# Patient Record
Sex: FEMALE | Race: BLACK OR AFRICAN AMERICAN | NOT HISPANIC OR LATINO | URBAN - METROPOLITAN AREA
[De-identification: names, ages, dates, MRNs, and addresses within clinical notes are randomized per-mention and may not be internally consistent; named-entity substitution may affect disease eponyms.]

---

## 2017-11-22 ENCOUNTER — INPATIENT (INPATIENT)
Facility: HOSPITAL | Age: 82
LOS: 1 days | Discharge: HOME | End: 2017-11-24
Attending: HOSPITALIST | Admitting: HOSPITALIST
Payer: MEDICARE

## 2017-11-22 VITALS
TEMPERATURE: 99 F | WEIGHT: 134.92 LBS | SYSTOLIC BLOOD PRESSURE: 141 MMHG | HEART RATE: 80 BPM | HEIGHT: 62 IN | DIASTOLIC BLOOD PRESSURE: 54 MMHG | OXYGEN SATURATION: 96 % | RESPIRATION RATE: 19 BRPM

## 2017-11-22 DIAGNOSIS — I10 ESSENTIAL (PRIMARY) HYPERTENSION: ICD-10-CM

## 2017-11-22 DIAGNOSIS — J06.9 ACUTE UPPER RESPIRATORY INFECTION, UNSPECIFIED: ICD-10-CM

## 2017-11-22 DIAGNOSIS — J40 BRONCHITIS, NOT SPECIFIED AS ACUTE OR CHRONIC: ICD-10-CM

## 2017-11-22 LAB
ALBUMIN SERPL ELPH-MCNC: 3.6 G/DL — SIGNIFICANT CHANGE UP (ref 3.3–5)
ALP SERPL-CCNC: 57 U/L — SIGNIFICANT CHANGE UP (ref 40–120)
ALT FLD-CCNC: 25 U/L — SIGNIFICANT CHANGE UP (ref 12–78)
ANION GAP SERPL CALC-SCNC: 11 MMOL/L — SIGNIFICANT CHANGE UP (ref 5–17)
ANION GAP SERPL CALC-SCNC: 8 MMOL/L — SIGNIFICANT CHANGE UP (ref 5–17)
APTT BLD: 28.2 SEC — SIGNIFICANT CHANGE UP (ref 27.5–37.4)
AST SERPL-CCNC: 27 U/L — SIGNIFICANT CHANGE UP (ref 15–37)
BASE EXCESS BLDA CALC-SCNC: -3.3 MMOL/L — LOW (ref -2–2)
BASOPHILS # BLD AUTO: 0.1 K/UL — SIGNIFICANT CHANGE UP (ref 0–0.2)
BASOPHILS NFR BLD AUTO: 1.2 % — SIGNIFICANT CHANGE UP (ref 0–2)
BILIRUB SERPL-MCNC: 0.4 MG/DL — SIGNIFICANT CHANGE UP (ref 0.2–1.2)
BLOOD GAS COMMENTS: SIGNIFICANT CHANGE UP
BLOOD GAS COMMENTS: SIGNIFICANT CHANGE UP
BLOOD GAS SOURCE: SIGNIFICANT CHANGE UP
BUN SERPL-MCNC: 20 MG/DL — SIGNIFICANT CHANGE UP (ref 7–23)
BUN SERPL-MCNC: 23 MG/DL — SIGNIFICANT CHANGE UP (ref 7–23)
CALCIUM SERPL-MCNC: 8.1 MG/DL — LOW (ref 8.5–10.1)
CALCIUM SERPL-MCNC: 8.4 MG/DL — LOW (ref 8.5–10.1)
CHLORIDE SERPL-SCNC: 103 MMOL/L — SIGNIFICANT CHANGE UP (ref 96–108)
CHLORIDE SERPL-SCNC: 106 MMOL/L — SIGNIFICANT CHANGE UP (ref 96–108)
CO2 SERPL-SCNC: 23 MMOL/L — SIGNIFICANT CHANGE UP (ref 22–31)
CO2 SERPL-SCNC: 26 MMOL/L — SIGNIFICANT CHANGE UP (ref 22–31)
CREAT SERPL-MCNC: 0.96 MG/DL — SIGNIFICANT CHANGE UP (ref 0.5–1.3)
CREAT SERPL-MCNC: 1.47 MG/DL — HIGH (ref 0.5–1.3)
D DIMER BLD IA.RAPID-MCNC: 206 NG/ML DDU — SIGNIFICANT CHANGE UP
EOSINOPHIL # BLD AUTO: 0 K/UL — SIGNIFICANT CHANGE UP (ref 0–0.5)
EOSINOPHIL NFR BLD AUTO: 0.5 % — SIGNIFICANT CHANGE UP (ref 0–6)
FLUAV SPEC QL CULT: NEGATIVE — SIGNIFICANT CHANGE UP
FLUBV AG SPEC QL IA: NEGATIVE — SIGNIFICANT CHANGE UP
GLUCOSE SERPL-MCNC: 131 MG/DL — HIGH (ref 70–99)
GLUCOSE SERPL-MCNC: 441 MG/DL — HIGH (ref 70–99)
HCO3 BLDA-SCNC: 21 MMOL/L — SIGNIFICANT CHANGE UP (ref 21–29)
HCT VFR BLD CALC: 36.6 % — SIGNIFICANT CHANGE UP (ref 34.5–45)
HGB BLD-MCNC: 12.1 G/DL — SIGNIFICANT CHANGE UP (ref 11.5–15.5)
HOROWITZ INDEX BLDA+IHG-RTO: 21 — SIGNIFICANT CHANGE UP
INR BLD: 0.98 RATIO — SIGNIFICANT CHANGE UP (ref 0.88–1.16)
LYMPHOCYTES # BLD AUTO: 0.7 K/UL — LOW (ref 1–3.3)
LYMPHOCYTES # BLD AUTO: 12.1 % — LOW (ref 13–44)
MCHC RBC-ENTMCNC: 30 PG — SIGNIFICANT CHANGE UP (ref 27–34)
MCHC RBC-ENTMCNC: 33.1 GM/DL — SIGNIFICANT CHANGE UP (ref 32–36)
MCV RBC AUTO: 90.7 FL — SIGNIFICANT CHANGE UP (ref 80–100)
MONOCYTES # BLD AUTO: 0.6 K/UL — SIGNIFICANT CHANGE UP (ref 0–0.9)
MONOCYTES NFR BLD AUTO: 11.6 % — SIGNIFICANT CHANGE UP (ref 2–14)
NEUTROPHILS # BLD AUTO: 4.1 K/UL — SIGNIFICANT CHANGE UP (ref 1.8–7.4)
NEUTROPHILS NFR BLD AUTO: 74.5 % — SIGNIFICANT CHANGE UP (ref 43–77)
PCO2 BLDA: 36 MMHG — SIGNIFICANT CHANGE UP (ref 32–46)
PH BLD: 7.38 — SIGNIFICANT CHANGE UP (ref 7.35–7.45)
PLATELET # BLD AUTO: 166 K/UL — SIGNIFICANT CHANGE UP (ref 150–400)
PO2 BLDA: 66 MMHG — LOW (ref 74–108)
POTASSIUM SERPL-MCNC: 3.9 MMOL/L — SIGNIFICANT CHANGE UP (ref 3.5–5.3)
POTASSIUM SERPL-MCNC: 4.1 MMOL/L — SIGNIFICANT CHANGE UP (ref 3.5–5.3)
POTASSIUM SERPL-SCNC: 3.9 MMOL/L — SIGNIFICANT CHANGE UP (ref 3.5–5.3)
POTASSIUM SERPL-SCNC: 4.1 MMOL/L — SIGNIFICANT CHANGE UP (ref 3.5–5.3)
PROT SERPL-MCNC: 7 GM/DL — SIGNIFICANT CHANGE UP (ref 6–8.3)
PROTHROM AB SERPL-ACNC: 10.7 SEC — SIGNIFICANT CHANGE UP (ref 9.8–12.7)
RBC # BLD: 4.04 M/UL — SIGNIFICANT CHANGE UP (ref 3.8–5.2)
RBC # FLD: 12.9 % — SIGNIFICANT CHANGE UP (ref 11–15)
SAO2 % BLDA: 92 % — SIGNIFICANT CHANGE UP (ref 92–96)
SODIUM SERPL-SCNC: 137 MMOL/L — SIGNIFICANT CHANGE UP (ref 135–145)
SODIUM SERPL-SCNC: 140 MMOL/L — SIGNIFICANT CHANGE UP (ref 135–145)
WBC # BLD: 5.5 K/UL — SIGNIFICANT CHANGE UP (ref 3.8–10.5)
WBC # FLD AUTO: 5.5 K/UL — SIGNIFICANT CHANGE UP (ref 3.8–10.5)

## 2017-11-22 PROCEDURE — 99223 1ST HOSP IP/OBS HIGH 75: CPT | Mod: AI

## 2017-11-22 PROCEDURE — 99285 EMERGENCY DEPT VISIT HI MDM: CPT

## 2017-11-22 PROCEDURE — 71020: CPT | Mod: 26

## 2017-11-22 PROCEDURE — 93010 ELECTROCARDIOGRAM REPORT: CPT

## 2017-11-22 RX ORDER — ALBUTEROL 90 UG/1
2.5 AEROSOL, METERED ORAL EVERY 6 HOURS
Qty: 0 | Refills: 0 | Status: DISCONTINUED | OUTPATIENT
Start: 2017-11-22 | End: 2017-11-24

## 2017-11-22 RX ORDER — ASPIRIN/CALCIUM CARB/MAGNESIUM 324 MG
81 TABLET ORAL DAILY
Qty: 0 | Refills: 0 | Status: DISCONTINUED | OUTPATIENT
Start: 2017-11-22 | End: 2017-11-24

## 2017-11-22 RX ORDER — MAGNESIUM SULFATE 500 MG/ML
1 VIAL (ML) INJECTION ONCE
Qty: 0 | Refills: 0 | Status: DISCONTINUED | OUTPATIENT
Start: 2017-11-22 | End: 2017-11-22

## 2017-11-22 RX ORDER — CEFTRIAXONE 500 MG/1
1 INJECTION, POWDER, FOR SOLUTION INTRAMUSCULAR; INTRAVENOUS ONCE
Qty: 0 | Refills: 0 | Status: COMPLETED | OUTPATIENT
Start: 2017-11-22 | End: 2017-11-22

## 2017-11-22 RX ORDER — CEFTRIAXONE 500 MG/1
INJECTION, POWDER, FOR SOLUTION INTRAMUSCULAR; INTRAVENOUS
Qty: 0 | Refills: 0 | Status: DISCONTINUED | OUTPATIENT
Start: 2017-11-22 | End: 2017-11-24

## 2017-11-22 RX ORDER — SODIUM CHLORIDE 9 MG/ML
1000 INJECTION INTRAMUSCULAR; INTRAVENOUS; SUBCUTANEOUS ONCE
Qty: 0 | Refills: 0 | Status: COMPLETED | OUTPATIENT
Start: 2017-11-22 | End: 2017-11-22

## 2017-11-22 RX ORDER — ENOXAPARIN SODIUM 100 MG/ML
40 INJECTION SUBCUTANEOUS EVERY 24 HOURS
Qty: 0 | Refills: 0 | Status: DISCONTINUED | OUTPATIENT
Start: 2017-11-22 | End: 2017-11-24

## 2017-11-22 RX ORDER — CEFTRIAXONE 500 MG/1
1 INJECTION, POWDER, FOR SOLUTION INTRAMUSCULAR; INTRAVENOUS EVERY 24 HOURS
Qty: 0 | Refills: 0 | Status: DISCONTINUED | OUTPATIENT
Start: 2017-11-23 | End: 2017-11-24

## 2017-11-22 RX ORDER — IPRATROPIUM/ALBUTEROL SULFATE 18-103MCG
3 AEROSOL WITH ADAPTER (GRAM) INHALATION
Qty: 0 | Refills: 0 | Status: COMPLETED | OUTPATIENT
Start: 2017-11-22 | End: 2017-11-22

## 2017-11-22 RX ORDER — AZITHROMYCIN 500 MG/1
500 TABLET, FILM COATED ORAL ONCE
Qty: 0 | Refills: 0 | Status: COMPLETED | OUTPATIENT
Start: 2017-11-22 | End: 2017-11-22

## 2017-11-22 RX ORDER — AZITHROMYCIN 500 MG/1
500 TABLET, FILM COATED ORAL DAILY
Qty: 0 | Refills: 0 | Status: DISCONTINUED | OUTPATIENT
Start: 2017-11-23 | End: 2017-11-24

## 2017-11-22 RX ORDER — IPRATROPIUM/ALBUTEROL SULFATE 18-103MCG
3 AEROSOL WITH ADAPTER (GRAM) INHALATION ONCE
Qty: 0 | Refills: 0 | Status: COMPLETED | OUTPATIENT
Start: 2017-11-22 | End: 2017-11-22

## 2017-11-22 RX ORDER — MAGNESIUM SULFATE 500 MG/ML
1 VIAL (ML) INJECTION ONCE
Qty: 0 | Refills: 0 | Status: COMPLETED | OUTPATIENT
Start: 2017-11-22 | End: 2017-11-22

## 2017-11-22 RX ORDER — ALBUTEROL 90 UG/1
1 AEROSOL, METERED ORAL EVERY 4 HOURS
Qty: 0 | Refills: 0 | Status: DISCONTINUED | OUTPATIENT
Start: 2017-11-22 | End: 2017-11-24

## 2017-11-22 RX ADMIN — AZITHROMYCIN 255 MILLIGRAM(S): 500 TABLET, FILM COATED ORAL at 13:20

## 2017-11-22 RX ADMIN — Medication 81 MILLIGRAM(S): at 17:23

## 2017-11-22 RX ADMIN — Medication 125 MILLIGRAM(S): at 12:20

## 2017-11-22 RX ADMIN — CEFTRIAXONE 100 GRAM(S): 500 INJECTION, POWDER, FOR SOLUTION INTRAMUSCULAR; INTRAVENOUS at 17:23

## 2017-11-22 RX ADMIN — SODIUM CHLORIDE 1000 MILLILITER(S): 9 INJECTION INTRAMUSCULAR; INTRAVENOUS; SUBCUTANEOUS at 12:00

## 2017-11-22 RX ADMIN — ALBUTEROL 2.5 MILLIGRAM(S): 90 AEROSOL, METERED ORAL at 23:32

## 2017-11-22 RX ADMIN — Medication 3 MILLILITER(S): at 12:20

## 2017-11-22 RX ADMIN — Medication 100 GRAM(S): at 14:12

## 2017-11-22 RX ADMIN — Medication 3 MILLILITER(S): at 13:20

## 2017-11-22 RX ADMIN — ENOXAPARIN SODIUM 40 MILLIGRAM(S): 100 INJECTION SUBCUTANEOUS at 17:23

## 2017-11-22 RX ADMIN — ALBUTEROL 2.5 MILLIGRAM(S): 90 AEROSOL, METERED ORAL at 18:05

## 2017-11-22 RX ADMIN — Medication 3 MILLILITER(S): at 12:05

## 2017-11-22 NOTE — H&P ADULT - ASSESSMENT
85f with acute bronchitis feeling better with nebs     IMPROVE VTE Individual Risk Assessment          RISK                                                          Points    [  ] Previous VTE                                                3    [  ] Thrombophilia                                             2    [  ] Lower limb paralysis                                    2        (unable to hold up >15 seconds)      [  ] Current Cancer                                             2         (within 6 months)    [  x] Immobilization > 24 hrs                              1    [  ] ICU/CCU stay > 24 hours                            1    [  x] Age > 60                                                    1  2  IMPROVE VTE Score2 _________

## 2017-11-22 NOTE — H&P ADULT - NSHPLABSRESULTS_GEN_ALL_CORE
12.1   5.5   )-----------( 166      ( 22 Nov 2017 12:07 )             36.6   11-22    140  |  106  |  20  ----------------------------<  131<H>  4.1   |  26  |  0.96    Ca    8.4<L>      22 Nov 2017 12:07    TPro  7.0  /  Alb  3.6  /  TBili  0.4  /  DBili  x   /  AST  27  /  ALT  25  /  AlkPhos  57  11-22  < from: Xray Chest 2 Views PA/Lat (11.22.17 @ 12:36) >    ear lungs.No acute airspace disease suggested.

## 2017-11-22 NOTE — H&P ADULT - NSHPPHYSICALEXAM_GEN_ALL_CORE
GENERAL: NAD well-developed  HEAD:  Atraumatic, Normocephalic  EYES: EOMI, PERRLA, conjunctiva and sclera clear  ENMT: No tonsillar erythema, exudates, or enlargement; Moist mucous membranes, Good dentition, No lesions  NECK: Supple, No JVD, Normal thyroid  NERVOUS SYSTEM:  Alert & Oriented X3, Good concentration; Motor Strength 5/5 B/L upper and lower extremities; DTRs 2+ intact and symmetric  CHEST/LUNG: diffuse wheezing expiratory   HEART: Regular rate and rhythm; No murmurs, rubs, or gallops  ABDOMEN: Soft, Nontender, Nondistended; Bowel sounds present  EXTREMITIES:  2+ Peripheral Pulses, No clubbing, cyanosis, or edema  LYMPH: No lymphadenopathy   SKIN: No rashes or lesions

## 2017-11-22 NOTE — H&P ADULT - NSHPREVIEWOFSYSTEMS_GEN_ALL_CORE
CONSTITUTIONAL: No fever, weight loss, or fatigue  EYES: No eye pain, visual disturbances, or discharge  ENMT:  No difficulty hearing, tinnitus, vertigo; No sinus or throat pain  NECK: No pain or stiffness  RESPIRATORY: POSITIVE  cough and  and  shortness of breath  CARDIOVASCULAR: No chest pain, palpitations, dizziness, or leg swelling  GASTROINTESTINAL: No abdominal or epigastric pain. No nausea, vomiting, or hematemesis; No diarrhea or constipation. No melena or hematochezia.  GENITOURINARY: No dysuria, frequency, hematuria, or incontinence  NEUROLOGICAL: No headaches, memory loss, loss of strength, numbness, or tremors  SKIN: No itching, burning, rashes, or lesions   LYMPH NODES: No enlarged glands  ENDOCRINE: No heat or cold intolerance; No hair loss  MUSCULOSKELETAL: No joint pain or swelling; No muscle, back, or extremity pain  PSYCHIATRIC: No depression, anxiety, mood swings, or difficulty sleeping  HEME/LYMPH: No easy bruising, or bleeding gums  ALLERGY AND IMMUNOLOGIC: No hives or eczema

## 2017-11-22 NOTE — ED PROVIDER NOTE - OBJECTIVE STATEMENT
85 year old female with h/o HTN presents today c/o one week history of cold symptoms, pt was in [place] x 6 weeks, returned on 11/19/17, while on the plane pt states that she sat next to a person that coughed throughout the entire flight, the next day her symptoms started +cough +sore throat +nonspecific sharp pain which she describes feeling like spasms when she cough (-) fevers or chills (-) weakness (-) bodyaches, she did call her PMD on 11/17/17 and was prescribed flonase, her PMD felt her symptoms were due to a postnasal drip, pt feels that she is not getting better

## 2017-11-22 NOTE — ED PROVIDER NOTE - CARE PLAN
Principal Discharge DX:	URI (upper respiratory infection) Principal Discharge DX:	URI (upper respiratory infection)  Secondary Diagnosis:	Bronchitis

## 2017-11-22 NOTE — ED ADULT TRIAGE NOTE - CHIEF COMPLAINT QUOTE
Pt c/o of flu like symptoms, coughig and chest congestion x 5 days since she came back from [facility]. Pt state that the passenger sitting next to her was coughing. Pt also reported chest pain  when coughing

## 2017-11-22 NOTE — H&P ADULT - HISTORY OF PRESENT ILLNESS
85 year old female with h/o HTN presents today c/o one week history of cold symptoms, pt was in [place] x 6 weeks, returned on 11/19/17, while on the plane pt states that she sat next to a person that coughed throughout the entire flight, the next day her symptoms started +cough +sore throat +nonspecific sharp pain which she describes feeling like spasms when she cough (-) fevers or chills (-) weakness (-) bodyaches POSITIVE wheezing , she did call her PMD on 11/17/17 and was prescribed flonase, her PMD felt her symptoms were due to a postnasal drip, pt feels that she is not getting better

## 2017-11-23 LAB
APPEARANCE UR: CLEAR — SIGNIFICANT CHANGE UP
BACTERIA # UR AUTO: ABNORMAL
BILIRUB UR-MCNC: NEGATIVE — SIGNIFICANT CHANGE UP
COLOR SPEC: YELLOW — SIGNIFICANT CHANGE UP
DIFF PNL FLD: ABNORMAL
EPI CELLS # UR: SIGNIFICANT CHANGE UP
GLUCOSE UR QL: 1000 MG/DL
HCT VFR BLD CALC: 33 % — LOW (ref 34.5–45)
HGB BLD-MCNC: 10.7 G/DL — LOW (ref 11.5–15.5)
KETONES UR-MCNC: NEGATIVE — SIGNIFICANT CHANGE UP
LEUKOCYTE ESTERASE UR-ACNC: ABNORMAL
MCHC RBC-ENTMCNC: 29.2 PG — SIGNIFICANT CHANGE UP (ref 27–34)
MCHC RBC-ENTMCNC: 32.6 GM/DL — SIGNIFICANT CHANGE UP (ref 32–36)
MCV RBC AUTO: 89.6 FL — SIGNIFICANT CHANGE UP (ref 80–100)
NITRITE UR-MCNC: NEGATIVE — SIGNIFICANT CHANGE UP
PH UR: 5 — SIGNIFICANT CHANGE UP (ref 5–8)
PLATELET # BLD AUTO: 184 K/UL — SIGNIFICANT CHANGE UP (ref 150–400)
PROT UR-MCNC: NEGATIVE MG/DL — SIGNIFICANT CHANGE UP
RBC # BLD: 3.68 M/UL — LOW (ref 3.8–5.2)
RBC # FLD: 13.1 % — SIGNIFICANT CHANGE UP (ref 11–15)
RBC CASTS # UR COMP ASSIST: SIGNIFICANT CHANGE UP /HPF (ref 0–4)
SP GR SPEC: 1.01 — SIGNIFICANT CHANGE UP (ref 1.01–1.02)
UROBILINOGEN FLD QL: NEGATIVE MG/DL — SIGNIFICANT CHANGE UP
WBC # BLD: 11.3 K/UL — HIGH (ref 3.8–10.5)
WBC # FLD AUTO: 11.3 K/UL — HIGH (ref 3.8–10.5)
WBC UR QL: SIGNIFICANT CHANGE UP

## 2017-11-23 PROCEDURE — 99233 SBSQ HOSP IP/OBS HIGH 50: CPT

## 2017-11-23 RX ADMIN — ALBUTEROL 2.5 MILLIGRAM(S): 90 AEROSOL, METERED ORAL at 11:31

## 2017-11-23 RX ADMIN — AZITHROMYCIN 500 MILLIGRAM(S): 500 TABLET, FILM COATED ORAL at 12:07

## 2017-11-23 RX ADMIN — ALBUTEROL 2.5 MILLIGRAM(S): 90 AEROSOL, METERED ORAL at 06:16

## 2017-11-23 RX ADMIN — CEFTRIAXONE 100 GRAM(S): 500 INJECTION, POWDER, FOR SOLUTION INTRAMUSCULAR; INTRAVENOUS at 17:26

## 2017-11-23 RX ADMIN — Medication 81 MILLIGRAM(S): at 12:07

## 2017-11-23 RX ADMIN — ENOXAPARIN SODIUM 40 MILLIGRAM(S): 100 INJECTION SUBCUTANEOUS at 17:30

## 2017-11-23 NOTE — PROGRESS NOTE ADULT - SUBJECTIVE AND OBJECTIVE BOX
CHIEF COMPLAINT/INTERVAL HISTORY:    Patient is a 85y old  Female who presents with a chief complaint of cough (2017 16:29)      HPI:  85 year old female with h/o HTN presents today c/o one week history of cold symptoms, pt was in [place] x 6 weeks, returned on 17, while on the plane pt states that she sat next to a person that coughed throughout the entire flight, the next day her symptoms started +cough +sore throat +nonspecific sharp pain which she describes feeling like spasms when she cough (-) fevers or chills (-) weakness (-) bodyaches POSITIVE wheezing , she did call her PMD on 17 and was prescribed flonase, her PMD felt her symptoms were due to a postnasal drip, pt feels that she is not getting better (2017 16:29)    Overnight issues  Still has some wheezing and cough  Feeling some better          SUBJECTIVE & OBJECTIVE: Pt seen and examined at bedside.   ROS:  CONSTITUTIONAL: No fever, weight loss, or fatigue  EYES: No eye pain, visual disturbances, or discharge  ENMT:  No difficulty hearing, tinnitus, vertigo; No sinus or throat pain  NECK: No pain or stiffness  RESPIRATORY: + cough,+ wheezing,  Nochills or hemoptysis; No shortness of breath  CARDIOVASCULAR: No chest pain, palpitations, dizziness, or leg swelling  GASTROINTESTINAL: No abdominal or epigastric pain. No nausea, vomiting, or hematemesis; No diarrhea or constipation. No melena or hematochezia.  GENITOURINARY: No dysuria, frequency, hematuria, or incontinence  NEUROLOGICAL: No headaches, memory loss, loss of strength, numbness, or tremors  SKIN: No itching, burning, rashes, or lesions   LYMPH NODES: No enlarged glands  ENDOCRINE: No heat or cold intolerance; No hair loss  MUSCULOSKELETAL: No joint pain or swelling; No muscle, back, or extremity pain  PSYCHIATRIC: No depression, anxiety, mood swings, or difficulty sleeping  HEME/LYMPH: No easy bruising, or bleeding gums  ALLERGY AND IMMUNOLOGIC: No hives or eczema  ICU Vital Signs Last 24 Hrs  T(C): 37.3 (2017 06:22), Max: 37.3 (2017 18:28)  T(F): 99.2 (2017 06:22), Max: 99.2 (2017 18:28)  HR: 71 (2017 06:43) (71 - 113)  BP: 132/50 (2017 06:22) (123/50 - 145/70)  BP(mean): 90 (2017 14:18) (90 - 90)  ABP: --  ABP(mean): --  RR: 16 (2017 06:22) (16 - 22)  SpO2: 98% (2017 06:43) (92% - 100%)        MEDICATIONS  (STANDING):  ALBUTerol    0.083% 2.5 milliGRAM(s) Nebulizer every 6 hours  ALBUTerol    90 MICROgram(s) HFA Inhaler 1 Puff(s) Inhalation every 4 hours  aspirin enteric coated 81 milliGRAM(s) Oral daily  azithromycin   Tablet 500 milliGRAM(s) Oral daily  cefTRIAXone   IVPB 1 Gram(s) IV Intermittent every 24 hours  cefTRIAXone   IVPB      enoxaparin Injectable 40 milliGRAM(s) SubCutaneous every 24 hours    MEDICATIONS  (PRN):        PHYSICAL EXAM:    GENERAL: NAD, well-groomed, well-developed  HEAD:  Atraumatic, Normocephalic  EYES: EOMI, PERRLA, conjunctiva and sclera clear  ENMT: Moist mucous membranes  NECK: Supple, No JVD  NERVOUS SYSTEM:  Alert & Oriented X3, Motor Strength 5/5 B/L upper and lower extremities; DTRs 2+ intact and symmetric  CHEST/LUNG: Clear to auscultation bilaterally; No rales, rhonchi, +wheezing  HEART: Regular rate and rhythm; No murmurs, rubs, or gallops  ABDOMEN: Soft, Nontender, Nondistended; Bowel sounds present  EXTREMITIES:  2+ Peripheral Pulses, No clubbing, cyanosis, or edema    LABS:                        10.7   11.3  )-----------( 184      ( 2017 07:29 )             33.0     11-22    137  |  103  |  23  ----------------------------<  441<H>  3.9   |  23  |  1.47<H>    Ca    8.1<L>      2017 17:56    TPro  7.0  /  Alb  3.6  /  TBili  0.4  /  DBili  x   /  AST  27  /  ALT  25  /  AlkPhos  57  11-22    PT/INR - ( 2017 16:28 )   PT: 10.7 sec;   INR: 0.98 ratio         PTT - ( 2017 16:28 )  PTT:28.2 sec  Urinalysis Basic - ( 2017 01:10 )    Color: Yellow / Appearance: Clear / S.010 / pH: x  Gluc: x / Ketone: Negative  / Bili: Negative / Urobili: Negative mg/dL   Blood: x / Protein: Negative mg/dL / Nitrite: Negative   Leuk Esterase: Trace / RBC: 0-2 /HPF / WBC 0-2   Sq Epi: x / Non Sq Epi: Few / Bacteria: Few        CAPILLARY BLOOD GLUCOSE          RECENT CULTURES:      RADIOLOGY & ADDITIONAL TESTS:  Imaging Personally Reviewed:  [ ] YES      Consultant(s) Notes Reviewed:  [ ] YES     Care Discussed with [ ] Consultants [X ] Patient [ ] Family  [ ]    [x ]  Other; RN  HEALTH ISSUES - PROBLEM Dx:  Essential hypertension: Essential hypertension  URI (upper respiratory infection): URI (upper respiratory infection)  Bronchitis: Bronchitis        DVT/GI ppx  Discussed with pt @ bedside

## 2017-11-24 ENCOUNTER — TRANSCRIPTION ENCOUNTER (OUTPATIENT)
Age: 82
End: 2017-11-24

## 2017-11-24 VITALS — OXYGEN SATURATION: 96 %

## 2017-11-24 DIAGNOSIS — B97.81 HUMAN METAPNEUMOVIRUS AS THE CAUSE OF DISEASES CLASSIFIED ELSEWHERE: ICD-10-CM

## 2017-11-24 LAB
ANION GAP SERPL CALC-SCNC: 7 MMOL/L — SIGNIFICANT CHANGE UP (ref 5–17)
BUN SERPL-MCNC: 28 MG/DL — HIGH (ref 7–23)
CALCIUM SERPL-MCNC: 8.1 MG/DL — LOW (ref 8.5–10.1)
CHLORIDE SERPL-SCNC: 109 MMOL/L — HIGH (ref 96–108)
CO2 SERPL-SCNC: 28 MMOL/L — SIGNIFICANT CHANGE UP (ref 22–31)
CREAT SERPL-MCNC: 1.01 MG/DL — SIGNIFICANT CHANGE UP (ref 0.5–1.3)
CULTURE RESULTS: NO GROWTH — SIGNIFICANT CHANGE UP
GLUCOSE SERPL-MCNC: 127 MG/DL — HIGH (ref 70–99)
HCT VFR BLD CALC: 33 % — LOW (ref 34.5–45)
HGB BLD-MCNC: 10.8 G/DL — LOW (ref 11.5–15.5)
MCHC RBC-ENTMCNC: 29.9 PG — SIGNIFICANT CHANGE UP (ref 27–34)
MCHC RBC-ENTMCNC: 32.8 GM/DL — SIGNIFICANT CHANGE UP (ref 32–36)
MCV RBC AUTO: 91.1 FL — SIGNIFICANT CHANGE UP (ref 80–100)
PLATELET # BLD AUTO: 157 K/UL — SIGNIFICANT CHANGE UP (ref 150–400)
POTASSIUM SERPL-MCNC: 4.1 MMOL/L — SIGNIFICANT CHANGE UP (ref 3.5–5.3)
POTASSIUM SERPL-SCNC: 4.1 MMOL/L — SIGNIFICANT CHANGE UP (ref 3.5–5.3)
RBC # BLD: 3.63 M/UL — LOW (ref 3.8–5.2)
RBC # FLD: 12.8 % — SIGNIFICANT CHANGE UP (ref 11–15)
SODIUM SERPL-SCNC: 144 MMOL/L — SIGNIFICANT CHANGE UP (ref 135–145)
SPECIMEN SOURCE: SIGNIFICANT CHANGE UP
WBC # BLD: 12 K/UL — HIGH (ref 3.8–10.5)
WBC # FLD AUTO: 12 K/UL — HIGH (ref 3.8–10.5)

## 2017-11-24 PROCEDURE — 99239 HOSP IP/OBS DSCHRG MGMT >30: CPT

## 2017-11-24 RX ORDER — BENZOCAINE AND MENTHOL 5; 1 G/100ML; G/100ML
1 LIQUID ORAL EVERY 6 HOURS
Qty: 0 | Refills: 0 | Status: DISCONTINUED | OUTPATIENT
Start: 2017-11-24 | End: 2017-11-24

## 2017-11-24 RX ADMIN — Medication 81 MILLIGRAM(S): at 11:13

## 2017-11-24 RX ADMIN — ALBUTEROL 2.5 MILLIGRAM(S): 90 AEROSOL, METERED ORAL at 05:35

## 2017-11-24 RX ADMIN — BENZOCAINE AND MENTHOL 1 LOZENGE: 5; 1 LIQUID ORAL at 11:13

## 2017-11-24 RX ADMIN — AZITHROMYCIN 500 MILLIGRAM(S): 500 TABLET, FILM COATED ORAL at 11:13

## 2017-11-24 RX ADMIN — ALBUTEROL 2.5 MILLIGRAM(S): 90 AEROSOL, METERED ORAL at 00:58

## 2017-11-24 RX ADMIN — ALBUTEROL 2.5 MILLIGRAM(S): 90 AEROSOL, METERED ORAL at 11:48

## 2017-11-24 NOTE — PROGRESS NOTE ADULT - SUBJECTIVE AND OBJECTIVE BOX
Patient is a 85y old  Female who presents with hMPV        SUBJECTIVE / OVERNIGHT EVENTS: still coughing, having trouble getting mucus up      MEDICATIONS  (STANDING):  ALBUTerol    0.083% 2.5 milliGRAM(s) Nebulizer every 6 hours  ALBUTerol    90 MICROgram(s) HFA Inhaler 1 Puff(s) Inhalation every 4 hours  aspirin enteric coated 81 milliGRAM(s) Oral daily  enoxaparin Injectable 40 milliGRAM(s) SubCutaneous every 24 hours  HYDROcodone/homatropine Syrup 5 milliLiter(s) Oral at bedtime    MEDICATIONS  (PRN):  benzocaine 15 mG/menthol 3.6 mG Lozenge 1 Lozenge Oral every 6 hours PRN Sore Throat  guaiFENesin    Syrup 200 milliGRAM(s) Oral every 6 hours PRN Cough      Vital Signs Last 24 Hrs    T(F): 98 (24 Nov 2017 11:09), Max: 99.7 (23 Nov 2017 23:16)  HR: 61 (24 Nov 2017 11:09) (61 - 84)  BP: 154/55 (24 Nov 2017 11:09) (113/59 - 154/55)  RR: 18 (24 Nov 2017 11:09) (16 - 18)  SpO2: 99% (24 Nov 2017 11:09) (96% - 100%)        PHYSICAL EXAM  GENERAL: NAD, well-developed  HEAD:  Atraumatic, Normocephalic  EYES: EOMI, PERRLA, conjunctiva and sclera clear  NECK: Supple, No JVD  CHEST/LUNG: Clear to auscultation bilaterally; No wheeze  HEART: Regular rate and rhythm; No murmurs, rubs, or gallops  ABDOMEN: Soft, Nontender, Nondistended; Bowel sounds present  EXTREMITIES:  2+ Peripheral Pulses, No clubbing, cyanosis, or edema  PSYCH: AAOx3      LABS:                        10.8   12.0  )-----------( 157      ( 24 Nov 2017 06:10 )             33.0     11-24    144  |  109<H>  |  28<H>  ----------------------------<  127<H>  4.1   |  28  |  1.01    Ca    8.1<L>      24 Nov 2017 06:10

## 2017-11-24 NOTE — DISCHARGE NOTE ADULT - CARE PLAN
Principal Discharge DX:	URI (upper respiratory infection)  Goal:	Continue supportive treatment  Instructions for follow-up, activity and diet:	Follow up with PMD  Secondary Diagnosis:	Human metapneumovirus (hMPV) as cause of disease classified elsewhere  Instructions for follow-up, activity and diet:	Follow up with PMD  Secondary Diagnosis:	Essential hypertension  Instructions for follow-up, activity and diet:	Continue home blood pressure medications  Follow up with PCP  Low-sodium diet  AHA Recipes - [email or web address]  Secondary Diagnosis:	Bronchitis  Goal:	Continue supportive treatment Principal Discharge DX:	URI (upper respiratory infection)  Goal:	Continue supportive treatment  Instructions for follow-up, activity and diet:	Follow up with PMD within 1 week  Secondary Diagnosis:	Human metapneumovirus (hMPV) as cause of disease classified elsewhere  Instructions for follow-up, activity and diet:	Follow up with PMD  Secondary Diagnosis:	Essential hypertension  Instructions for follow-up, activity and diet:	Continue home blood pressure medications  Follow up with PCP  Low-sodium diet  AHA Recipes - [email or web address]  Secondary Diagnosis:	Bronchitis  Goal:	Continue supportive treatment

## 2017-11-24 NOTE — DISCHARGE NOTE ADULT - PATIENT PORTAL LINK FT
“You can access the FollowHealth Patient Portal, offered by [facility], by registering with the following website: [email or web address]”

## 2017-11-24 NOTE — DISCHARGE NOTE ADULT - HOSPITAL COURSE
85f with acute bronchitis feeling better with nebs. Stable for discharge home with supportive treatment and follow up with PMD.    Problem/Plan - 1:  ·  Problem: Human metapneumovirus (hMPV) as cause of disease classified elsewhere.  Plan: supportive care  expectorant.     Problem/Plan - 2:  ·  Problem: Essential hypertension.  Plan: BP good. 85f with acute bronchitis feeling better with nebs. Stable for discharge home with supportive treatment and follow up with PMD.    Problem/Plan - 1:  ·  Problem: Human metapneumovirus (hMPV) as cause of disease classified elsewhere.  Plan: supportive care  expectorant. Hycodan for bedtime    Problem/Plan - 2:  ·  Problem: Essential hypertension.  Plan: BP good.

## 2017-11-24 NOTE — DISCHARGE NOTE ADULT - PROVIDER TOKENS
FREE:[LAST:[PMD],PHONE:[(   )    -],FAX:[(   )    -]] FREE:[LAST:[Dr Foley],PHONE:[(   )    -],FAX:[(   )    -]]

## 2017-11-24 NOTE — DISCHARGE NOTE ADULT - PLAN OF CARE
Continue supportive treatment Follow up with PMD Continue home blood pressure medications  Follow up with PCP  Low-sodium diet  AHA Recipes - [email or web address] Follow up with PMD within 1 week

## 2017-11-24 NOTE — DISCHARGE NOTE ADULT - MEDICATION SUMMARY - MEDICATIONS TO TAKE
I will START or STAY ON the medications listed below when I get home from the hospital:    Aspir 81 81 mg oral tablet  -- 1 tab(s) by mouth once a day  -- Indication: For cad    diazepam 5 mg oral tablet  --  by mouth once a day (at bedtime), As Needed  -- Indication: For anxiety    guaiFENesin 100 mg/5 mL oral liquid  -- 10 milliliter(s) by mouth every 6 hours, As needed, Cough  -- Indication: For cough    hydrocodone-homatropine 5 mg-1.5 mg/5 mL oral syrup  -- 5 milliliter(s) by mouth once a day (at bedtime), As Needed MDD:5 mililiter  -- Indication: For cough

## 2017-11-24 NOTE — DISCHARGE NOTE ADULT - SECONDARY DIAGNOSIS.
Human metapneumovirus (hMPV) as cause of disease classified elsewhere Essential hypertension Bronchitis

## 2017-12-01 DIAGNOSIS — Z79.82 LONG TERM (CURRENT) USE OF ASPIRIN: ICD-10-CM

## 2017-12-01 DIAGNOSIS — I10 ESSENTIAL (PRIMARY) HYPERTENSION: ICD-10-CM

## 2017-12-01 DIAGNOSIS — B97.81 HUMAN METAPNEUMOVIRUS AS THE CAUSE OF DISEASES CLASSIFIED ELSEWHERE: ICD-10-CM

## 2017-12-01 DIAGNOSIS — J20.9 ACUTE BRONCHITIS, UNSPECIFIED: ICD-10-CM

## 2017-12-01 DIAGNOSIS — J06.9 ACUTE UPPER RESPIRATORY INFECTION, UNSPECIFIED: ICD-10-CM

## 2019-10-04 ENCOUNTER — OUTPATIENT (OUTPATIENT)
Dept: OUTPATIENT SERVICES | Facility: HOSPITAL | Age: 84
LOS: 1 days | Discharge: HOME | End: 2019-10-04

## 2019-10-04 DIAGNOSIS — Z00.00 ENCOUNTER FOR GENERAL ADULT MEDICAL EXAMINATION WITHOUT ABNORMAL FINDINGS: ICD-10-CM

## 2019-10-04 DIAGNOSIS — R01.1 CARDIAC MURMUR, UNSPECIFIED: ICD-10-CM

## 2019-10-04 PROBLEM — I10 ESSENTIAL (PRIMARY) HYPERTENSION: Chronic | Status: ACTIVE | Noted: 2017-11-22

## 2019-10-04 LAB
24R-OH-CALCIDIOL SERPL-MCNC: 30.6 NG/ML — SIGNIFICANT CHANGE UP (ref 30–80)
ALBUMIN SERPL ELPH-MCNC: 3.7 G/DL — SIGNIFICANT CHANGE UP (ref 3.3–5)
ALP SERPL-CCNC: 55 U/L — SIGNIFICANT CHANGE UP (ref 40–120)
ALT FLD-CCNC: 18 U/L — SIGNIFICANT CHANGE UP (ref 12–78)
ANION GAP SERPL CALC-SCNC: 6 MMOL/L — SIGNIFICANT CHANGE UP (ref 5–17)
AST SERPL-CCNC: 14 U/L — LOW (ref 15–37)
BILIRUB SERPL-MCNC: 0.5 MG/DL — SIGNIFICANT CHANGE UP (ref 0.2–1.2)
BUN SERPL-MCNC: 21 MG/DL — SIGNIFICANT CHANGE UP (ref 7–23)
CALCIUM SERPL-MCNC: 8.7 MG/DL — SIGNIFICANT CHANGE UP (ref 8.5–10.1)
CHLORIDE SERPL-SCNC: 108 MMOL/L — SIGNIFICANT CHANGE UP (ref 96–108)
CHOLEST SERPL-MCNC: 144 MG/DL — SIGNIFICANT CHANGE UP (ref 10–199)
CO2 SERPL-SCNC: 27 MMOL/L — SIGNIFICANT CHANGE UP (ref 22–31)
CREAT SERPL-MCNC: 1.01 MG/DL — SIGNIFICANT CHANGE UP (ref 0.5–1.3)
GLUCOSE SERPL-MCNC: 170 MG/DL — HIGH (ref 70–99)
HBA1C BLD-MCNC: 7.6 % — HIGH (ref 4–5.6)
HCT VFR BLD CALC: 40.1 % — SIGNIFICANT CHANGE UP (ref 34.5–45)
HDLC SERPL-MCNC: 45 MG/DL — LOW
HGB BLD-MCNC: 12.7 G/DL — SIGNIFICANT CHANGE UP (ref 11.5–15.5)
LIPID PNL WITH DIRECT LDL SERPL: 79 MG/DL — SIGNIFICANT CHANGE UP
MCHC RBC-ENTMCNC: 28 PG — SIGNIFICANT CHANGE UP (ref 27–34)
MCHC RBC-ENTMCNC: 31.7 GM/DL — LOW (ref 32–36)
MCV RBC AUTO: 88.3 FL — SIGNIFICANT CHANGE UP (ref 80–100)
NRBC # BLD: 0 /100 WBCS — SIGNIFICANT CHANGE UP (ref 0–0)
PLATELET # BLD AUTO: 228 K/UL — SIGNIFICANT CHANGE UP (ref 150–400)
POTASSIUM SERPL-MCNC: 4.4 MMOL/L — SIGNIFICANT CHANGE UP (ref 3.5–5.3)
POTASSIUM SERPL-SCNC: 4.4 MMOL/L — SIGNIFICANT CHANGE UP (ref 3.5–5.3)
PROT SERPL-MCNC: 7.1 GM/DL — SIGNIFICANT CHANGE UP (ref 6–8.3)
RBC # BLD: 4.54 M/UL — SIGNIFICANT CHANGE UP (ref 3.8–5.2)
RBC # FLD: 13.6 % — SIGNIFICANT CHANGE UP (ref 10.3–14.5)
SODIUM SERPL-SCNC: 141 MMOL/L — SIGNIFICANT CHANGE UP (ref 135–145)
TOTAL CHOLESTEROL/HDL RATIO MEASUREMENT: 3.2 RATIO — LOW (ref 3.3–7.1)
TRIGL SERPL-MCNC: 102 MG/DL — SIGNIFICANT CHANGE UP (ref 10–149)
WBC # BLD: 8.48 K/UL — SIGNIFICANT CHANGE UP (ref 3.8–10.5)
WBC # FLD AUTO: 8.48 K/UL — SIGNIFICANT CHANGE UP (ref 3.8–10.5)

## 2020-08-05 ENCOUNTER — EMERGENCY (EMERGENCY)
Facility: HOSPITAL | Age: 85
LOS: 0 days | Discharge: HOME | End: 2020-08-05
Attending: STUDENT IN AN ORGANIZED HEALTH CARE EDUCATION/TRAINING PROGRAM
Payer: MEDICARE

## 2020-08-05 VITALS
OXYGEN SATURATION: 96 % | HEART RATE: 66 BPM | DIASTOLIC BLOOD PRESSURE: 58 MMHG | RESPIRATION RATE: 18 BRPM | TEMPERATURE: 98 F | WEIGHT: 139.99 LBS | HEIGHT: 63 IN | SYSTOLIC BLOOD PRESSURE: 112 MMHG

## 2020-08-05 VITALS
DIASTOLIC BLOOD PRESSURE: 76 MMHG | RESPIRATION RATE: 17 BRPM | HEART RATE: 71 BPM | TEMPERATURE: 98 F | OXYGEN SATURATION: 98 % | SYSTOLIC BLOOD PRESSURE: 126 MMHG

## 2020-08-05 DIAGNOSIS — R42 DIZZINESS AND GIDDINESS: ICD-10-CM

## 2020-08-05 DIAGNOSIS — R53.1 WEAKNESS: ICD-10-CM

## 2020-08-05 DIAGNOSIS — Z79.82 LONG TERM (CURRENT) USE OF ASPIRIN: ICD-10-CM

## 2020-08-05 DIAGNOSIS — R11.10 VOMITING, UNSPECIFIED: ICD-10-CM

## 2020-08-05 DIAGNOSIS — I10 ESSENTIAL (PRIMARY) HYPERTENSION: ICD-10-CM

## 2020-08-05 LAB
ALBUMIN SERPL ELPH-MCNC: 3.8 G/DL — SIGNIFICANT CHANGE UP (ref 3.3–5)
ALP SERPL-CCNC: 78 U/L — SIGNIFICANT CHANGE UP (ref 40–120)
ALT FLD-CCNC: 17 U/L — SIGNIFICANT CHANGE UP (ref 12–78)
ANION GAP SERPL CALC-SCNC: 5 MMOL/L — SIGNIFICANT CHANGE UP (ref 5–17)
APPEARANCE UR: CLEAR — SIGNIFICANT CHANGE UP
AST SERPL-CCNC: 19 U/L — SIGNIFICANT CHANGE UP (ref 15–37)
BACTERIA # UR AUTO: NEGATIVE — SIGNIFICANT CHANGE UP
BASOPHILS # BLD AUTO: 0.03 K/UL — SIGNIFICANT CHANGE UP (ref 0–0.2)
BASOPHILS NFR BLD AUTO: 0.3 % — SIGNIFICANT CHANGE UP (ref 0–2)
BILIRUB SERPL-MCNC: 0.4 MG/DL — SIGNIFICANT CHANGE UP (ref 0.2–1.2)
BILIRUB UR-MCNC: NEGATIVE — SIGNIFICANT CHANGE UP
BUN SERPL-MCNC: 18 MG/DL — SIGNIFICANT CHANGE UP (ref 7–23)
CALCIUM SERPL-MCNC: 8.7 MG/DL — SIGNIFICANT CHANGE UP (ref 8.5–10.1)
CHLORIDE SERPL-SCNC: 108 MMOL/L — SIGNIFICANT CHANGE UP (ref 96–108)
CK MB BLD-MCNC: <2.2 % — SIGNIFICANT CHANGE UP (ref 0–3.5)
CK MB CFR SERPL CALC: <1 NG/ML — SIGNIFICANT CHANGE UP (ref 0.5–3.6)
CK SERPL-CCNC: 46 U/L — SIGNIFICANT CHANGE UP (ref 26–192)
CO2 SERPL-SCNC: 28 MMOL/L — SIGNIFICANT CHANGE UP (ref 22–31)
COLOR SPEC: YELLOW — SIGNIFICANT CHANGE UP
CREAT SERPL-MCNC: 0.91 MG/DL — SIGNIFICANT CHANGE UP (ref 0.5–1.3)
DIFF PNL FLD: NEGATIVE — SIGNIFICANT CHANGE UP
EOSINOPHIL # BLD AUTO: 0.01 K/UL — SIGNIFICANT CHANGE UP (ref 0–0.5)
EOSINOPHIL NFR BLD AUTO: 0.1 % — SIGNIFICANT CHANGE UP (ref 0–6)
EPI CELLS # UR: SIGNIFICANT CHANGE UP
GLUCOSE SERPL-MCNC: 213 MG/DL — HIGH (ref 70–99)
GLUCOSE UR QL: NEGATIVE MG/DL — SIGNIFICANT CHANGE UP
HCT VFR BLD CALC: 36.7 % — SIGNIFICANT CHANGE UP (ref 34.5–45)
HGB BLD-MCNC: 11.7 G/DL — SIGNIFICANT CHANGE UP (ref 11.5–15.5)
IMM GRANULOCYTES NFR BLD AUTO: 0.7 % — SIGNIFICANT CHANGE UP (ref 0–1.5)
KETONES UR-MCNC: ABNORMAL
LEUKOCYTE ESTERASE UR-ACNC: ABNORMAL
LYMPHOCYTES # BLD AUTO: 0.82 K/UL — LOW (ref 1–3.3)
LYMPHOCYTES # BLD AUTO: 7.2 % — LOW (ref 13–44)
MCHC RBC-ENTMCNC: 27.5 PG — SIGNIFICANT CHANGE UP (ref 27–34)
MCHC RBC-ENTMCNC: 31.9 GM/DL — LOW (ref 32–36)
MCV RBC AUTO: 86.2 FL — SIGNIFICANT CHANGE UP (ref 80–100)
MONOCYTES # BLD AUTO: 0.47 K/UL — SIGNIFICANT CHANGE UP (ref 0–0.9)
MONOCYTES NFR BLD AUTO: 4.1 % — SIGNIFICANT CHANGE UP (ref 2–14)
NEUTROPHILS # BLD AUTO: 9.96 K/UL — HIGH (ref 1.8–7.4)
NEUTROPHILS NFR BLD AUTO: 87.6 % — HIGH (ref 43–77)
NITRITE UR-MCNC: NEGATIVE — SIGNIFICANT CHANGE UP
NRBC # BLD: 0 /100 WBCS — SIGNIFICANT CHANGE UP (ref 0–0)
PH UR: 6 — SIGNIFICANT CHANGE UP (ref 5–8)
PLATELET # BLD AUTO: 223 K/UL — SIGNIFICANT CHANGE UP (ref 150–400)
POTASSIUM SERPL-MCNC: 4.3 MMOL/L — SIGNIFICANT CHANGE UP (ref 3.5–5.3)
POTASSIUM SERPL-SCNC: 4.3 MMOL/L — SIGNIFICANT CHANGE UP (ref 3.5–5.3)
PROT SERPL-MCNC: 7.2 GM/DL — SIGNIFICANT CHANGE UP (ref 6–8.3)
PROT UR-MCNC: 15 MG/DL
RBC # BLD: 4.26 M/UL — SIGNIFICANT CHANGE UP (ref 3.8–5.2)
RBC # FLD: 14.1 % — SIGNIFICANT CHANGE UP (ref 10.3–14.5)
RBC CASTS # UR COMP ASSIST: SIGNIFICANT CHANGE UP /HPF (ref 0–4)
SODIUM SERPL-SCNC: 141 MMOL/L — SIGNIFICANT CHANGE UP (ref 135–145)
SP GR SPEC: 1.01 — SIGNIFICANT CHANGE UP (ref 1.01–1.02)
TROPONIN I SERPL-MCNC: <.015 NG/ML — SIGNIFICANT CHANGE UP (ref 0.01–0.04)
UROBILINOGEN FLD QL: NEGATIVE MG/DL — SIGNIFICANT CHANGE UP
WBC # BLD: 11.37 K/UL — HIGH (ref 3.8–10.5)
WBC # FLD AUTO: 11.37 K/UL — HIGH (ref 3.8–10.5)
WBC UR QL: SIGNIFICANT CHANGE UP

## 2020-08-05 PROCEDURE — 71045 X-RAY EXAM CHEST 1 VIEW: CPT | Mod: 26

## 2020-08-05 PROCEDURE — 93010 ELECTROCARDIOGRAM REPORT: CPT

## 2020-08-05 PROCEDURE — 99285 EMERGENCY DEPT VISIT HI MDM: CPT

## 2020-08-05 PROCEDURE — 70551 MRI BRAIN STEM W/O DYE: CPT | Mod: 26

## 2020-08-05 PROCEDURE — 70450 CT HEAD/BRAIN W/O DYE: CPT | Mod: 26

## 2020-08-05 RX ORDER — MECLIZINE HCL 12.5 MG
50 TABLET ORAL ONCE
Refills: 0 | Status: COMPLETED | OUTPATIENT
Start: 2020-08-05 | End: 2020-08-05

## 2020-08-05 RX ORDER — ONDANSETRON 8 MG/1
4 TABLET, FILM COATED ORAL ONCE
Refills: 0 | Status: COMPLETED | OUTPATIENT
Start: 2020-08-05 | End: 2020-08-05

## 2020-08-05 RX ORDER — MECLIZINE HCL 12.5 MG
1 TABLET ORAL
Qty: 30 | Refills: 0
Start: 2020-08-05 | End: 2020-08-19

## 2020-08-05 RX ADMIN — Medication 50 MILLIGRAM(S): at 12:15

## 2020-08-05 RX ADMIN — ONDANSETRON 4 MILLIGRAM(S): 8 TABLET, FILM COATED ORAL at 12:18

## 2020-08-05 NOTE — ED PROVIDER NOTE - CLINICAL SUMMARY MEDICAL DECISION MAKING FREE TEXT BOX
pt presented today c/o dizziness, has distant h/o vertigo and currently on no medications, labs within normal, ekg shows no acute changes, ct head negative, pt did feel better after antivert but c/o still felling dizzy, mri

## 2020-08-05 NOTE — ED PROVIDER NOTE - PROGRESS NOTE DETAILS
pt is able to ambulate to the bathroom with steady gait, but still c/o mild dizziness mri ordered, negative, antivert prescribed

## 2020-08-05 NOTE — ED PROVIDER NOTE - PATIENT PORTAL LINK FT
You can access the FollowMyHealth Patient Portal offered by [facility] by registering at the following website: [email or web address]. By joining [employer]’s FollowMyHealth portal, you will also be able to view your health information using other applications (apps) compatible with our system.

## 2020-08-05 NOTE — ED ADULT TRIAGE NOTE - CHIEF COMPLAINT QUOTE
pt complaining of dizziness and vomiting since this am. she states she feels like the room is spinning whenever she moves.

## 2020-08-05 NOTE — ED PROVIDER NOTE - OBJECTIVE STATEMENT
88 y/o F Hx of HTN, DM presents with dizziness x2 days ago. Pt states that after a while symptoms do relieve, but this morning she felt worse. Pt states she has been vomiting, feeling weak, and unable to walk. Pt with a Hx of vertigo years ago. No cp, or HA. She states she has noticed symptoms early morning when she tries to get out of bed. She states she feels better when she is laying down and has vomited x3 times. 86 y/o F Hx of HTN, DM presents with dizziness x 2 days,, but this morning she felt worse. Pt states she has been vomiting, feeling weak, and unable to walk. Pt with a Hx of vertigo years ago, currently on no medications, +nausea and vomiting (-) chest pain (-) h/a (-) visual or speech changes(-) generalized weakness  She states she has noticed symptoms early morning when she tried to get out of bed, she feels better when she is laying down

## 2020-08-05 NOTE — ED ADULT NURSE NOTE - OBJECTIVE STATEMENT
Pt is awake, alert, oriented to person, place, time, and situation, presenting to ED for c/o dizziness and vomiting(3) since this morning. No other accompanying symptoms. Pt reports that two days ago she had dizziness which was mild.  A few years ago, pt was diagnosed with vertigo, but has not had any recurrence. Pt has a history of htn, hld, DM. Blood drawn,#20g heplock placed to right a/c, visible through transparent dressing. Pt placed on continuous cardiac monitoring. Awaiting urine for analysis and culture. All questions answered. Pending CT. Nursing care on monitoring continues.

## 2020-08-07 LAB
CULTURE RESULTS: SIGNIFICANT CHANGE UP
SPECIMEN SOURCE: SIGNIFICANT CHANGE UP

## 2020-09-30 NOTE — DISCHARGE NOTE ADULT - PHYSICIAN SECTION COMPLETE
Problem: Pain:  Goal: Pain level will decrease  Description: Pain level will decrease  Outcome: Met This Shift     Problem: Pain:  Goal: Control of acute pain  Description: Control of acute pain  Outcome: Met This Shift     Problem: Pain:  Goal: Control of chronic pain  Description: Control of chronic pain  Outcome: Met This Shift     Problem: Falls - Risk of:  Goal: Will remain free from falls  Description: Will remain free from falls  Outcome: Met This Shift     Problem: Falls - Risk of:  Goal: Absence of physical injury  Description: Absence of physical injury  Outcome: Met This Shift Yes

## 2021-05-28 ENCOUNTER — EMERGENCY (EMERGENCY)
Facility: HOSPITAL | Age: 86
LOS: 0 days | Discharge: HOME | End: 2021-05-28
Attending: EMERGENCY MEDICINE
Payer: MEDICARE

## 2021-05-28 VITALS
SYSTOLIC BLOOD PRESSURE: 169 MMHG | TEMPERATURE: 98 F | RESPIRATION RATE: 17 BRPM | DIASTOLIC BLOOD PRESSURE: 74 MMHG | OXYGEN SATURATION: 98 % | HEART RATE: 82 BPM

## 2021-05-28 VITALS
SYSTOLIC BLOOD PRESSURE: 123 MMHG | RESPIRATION RATE: 20 BRPM | TEMPERATURE: 98 F | OXYGEN SATURATION: 97 % | DIASTOLIC BLOOD PRESSURE: 58 MMHG | HEIGHT: 63 IN | HEART RATE: 109 BPM | WEIGHT: 128.97 LBS

## 2021-05-28 DIAGNOSIS — E78.5 HYPERLIPIDEMIA, UNSPECIFIED: ICD-10-CM

## 2021-05-28 DIAGNOSIS — J38.7 OTHER DISEASES OF LARYNX: ICD-10-CM

## 2021-05-28 DIAGNOSIS — R05 COUGH: ICD-10-CM

## 2021-05-28 DIAGNOSIS — R29.700 NIHSS SCORE 0: ICD-10-CM

## 2021-05-28 DIAGNOSIS — I10 ESSENTIAL (PRIMARY) HYPERTENSION: ICD-10-CM

## 2021-05-28 DIAGNOSIS — Z79.82 LONG TERM (CURRENT) USE OF ASPIRIN: ICD-10-CM

## 2021-05-28 DIAGNOSIS — E11.9 TYPE 2 DIABETES MELLITUS WITHOUT COMPLICATIONS: ICD-10-CM

## 2021-05-28 DIAGNOSIS — R13.10 DYSPHAGIA, UNSPECIFIED: ICD-10-CM

## 2021-05-28 LAB
ALBUMIN SERPL ELPH-MCNC: 3.9 G/DL — SIGNIFICANT CHANGE UP (ref 3.3–5)
ALP SERPL-CCNC: 79 U/L — SIGNIFICANT CHANGE UP (ref 40–120)
ALT FLD-CCNC: 53 U/L — SIGNIFICANT CHANGE UP (ref 12–78)
ANION GAP SERPL CALC-SCNC: 9 MMOL/L — SIGNIFICANT CHANGE UP (ref 5–17)
AST SERPL-CCNC: 40 U/L — HIGH (ref 15–37)
BASOPHILS # BLD AUTO: 0.03 K/UL — SIGNIFICANT CHANGE UP (ref 0–0.2)
BASOPHILS NFR BLD AUTO: 0.3 % — SIGNIFICANT CHANGE UP (ref 0–2)
BILIRUB SERPL-MCNC: 0.5 MG/DL — SIGNIFICANT CHANGE UP (ref 0.2–1.2)
BUN SERPL-MCNC: 21 MG/DL — SIGNIFICANT CHANGE UP (ref 7–23)
CALCIUM SERPL-MCNC: 9.4 MG/DL — SIGNIFICANT CHANGE UP (ref 8.5–10.1)
CHLORIDE SERPL-SCNC: 102 MMOL/L — SIGNIFICANT CHANGE UP (ref 96–108)
CO2 SERPL-SCNC: 26 MMOL/L — SIGNIFICANT CHANGE UP (ref 22–31)
CREAT SERPL-MCNC: 1.17 MG/DL — SIGNIFICANT CHANGE UP (ref 0.5–1.3)
EOSINOPHIL # BLD AUTO: 0.06 K/UL — SIGNIFICANT CHANGE UP (ref 0–0.5)
EOSINOPHIL NFR BLD AUTO: 0.6 % — SIGNIFICANT CHANGE UP (ref 0–6)
GLUCOSE SERPL-MCNC: 130 MG/DL — HIGH (ref 70–99)
HCT VFR BLD CALC: 35 % — SIGNIFICANT CHANGE UP (ref 34.5–45)
HGB BLD-MCNC: 11.3 G/DL — LOW (ref 11.5–15.5)
IMM GRANULOCYTES NFR BLD AUTO: 0.6 % — SIGNIFICANT CHANGE UP (ref 0–1.5)
LYMPHOCYTES # BLD AUTO: 1.39 K/UL — SIGNIFICANT CHANGE UP (ref 1–3.3)
LYMPHOCYTES # BLD AUTO: 13.1 % — SIGNIFICANT CHANGE UP (ref 13–44)
MCHC RBC-ENTMCNC: 26.8 PG — LOW (ref 27–34)
MCHC RBC-ENTMCNC: 32.3 GM/DL — SIGNIFICANT CHANGE UP (ref 32–36)
MCV RBC AUTO: 83.1 FL — SIGNIFICANT CHANGE UP (ref 80–100)
MONOCYTES # BLD AUTO: 0.85 K/UL — SIGNIFICANT CHANGE UP (ref 0–0.9)
MONOCYTES NFR BLD AUTO: 8 % — SIGNIFICANT CHANGE UP (ref 2–14)
NEUTROPHILS # BLD AUTO: 8.26 K/UL — HIGH (ref 1.8–7.4)
NEUTROPHILS NFR BLD AUTO: 77.4 % — HIGH (ref 43–77)
NRBC # BLD: 0 /100 WBCS — SIGNIFICANT CHANGE UP (ref 0–0)
PLATELET # BLD AUTO: 329 K/UL — SIGNIFICANT CHANGE UP (ref 150–400)
POTASSIUM SERPL-MCNC: 4.3 MMOL/L — SIGNIFICANT CHANGE UP (ref 3.5–5.3)
POTASSIUM SERPL-SCNC: 4.3 MMOL/L — SIGNIFICANT CHANGE UP (ref 3.5–5.3)
PROT SERPL-MCNC: 7.4 GM/DL — SIGNIFICANT CHANGE UP (ref 6–8.3)
RBC # BLD: 4.21 M/UL — SIGNIFICANT CHANGE UP (ref 3.8–5.2)
RBC # FLD: 15.7 % — HIGH (ref 10.3–14.5)
SODIUM SERPL-SCNC: 137 MMOL/L — SIGNIFICANT CHANGE UP (ref 135–145)
WBC # BLD: 10.65 K/UL — HIGH (ref 3.8–10.5)
WBC # FLD AUTO: 10.65 K/UL — HIGH (ref 3.8–10.5)

## 2021-05-28 PROCEDURE — 70491 CT SOFT TISSUE NECK W/DYE: CPT | Mod: 26,MH

## 2021-05-28 PROCEDURE — 70450 CT HEAD/BRAIN W/O DYE: CPT | Mod: 26,MH

## 2021-05-28 PROCEDURE — 99284 EMERGENCY DEPT VISIT MOD MDM: CPT

## 2021-05-28 PROCEDURE — 71045 X-RAY EXAM CHEST 1 VIEW: CPT | Mod: 26

## 2021-05-28 RX ORDER — SODIUM CHLORIDE 9 MG/ML
500 INJECTION INTRAMUSCULAR; INTRAVENOUS; SUBCUTANEOUS ONCE
Refills: 0 | Status: COMPLETED | OUTPATIENT
Start: 2021-05-28 | End: 2021-05-28

## 2021-05-28 RX ADMIN — SODIUM CHLORIDE 500 MILLILITER(S): 9 INJECTION INTRAMUSCULAR; INTRAVENOUS; SUBCUTANEOUS at 19:14

## 2021-05-28 RX ADMIN — SODIUM CHLORIDE 500 MILLILITER(S): 9 INJECTION INTRAMUSCULAR; INTRAVENOUS; SUBCUTANEOUS at 20:26

## 2021-05-28 NOTE — ED ADULT NURSE NOTE - OBJECTIVE STATEMENT
received pt in bed C, 87yo female with pmh HTN presents with difficult swallowing on the LEFT side of throat x 1 week. Pt reports she is able to swallow on the right, feels something is stuck on left or not moving. Pt seen by PMD who referred to ENT. Pt saw ENT yesterday and had scope and reports it was normal. ENT referred for MRI, but pt unable to wait 2 weeks, requesting meds to help with swallowing. Pt reports she Is unable to swallow and causing a dry cough. denies headache, dizziness, cp, sob, abd pain. denies any other weakness.

## 2021-05-28 NOTE — ED PROVIDER NOTE - PHYSICAL EXAMINATION
Gen: Alert, Well appearing. NAD  , + dry cough  Head: NC, AT, PERRL, normal lids/conjunctiva   ENT: Bilateral TM WNL, patent oropharynx without erythema/exudate, uvula midline  Neck: supple, no tenderness/meningismus  Pulm: Bilateral clear BS, normal resp effort  CV: RRR, no M/R/G, +dist pulses   Abd: soft, NT/ND, +BS, no guarding/rebound tenderness  Mskel: no edema/erythema/cyanosis   Skin: no rash, no bruising  Neuro: AAOx3, no sensory/motor deficits, CN 2-12 intact

## 2021-05-28 NOTE — ED PROVIDER NOTE - PATIENT PORTAL LINK FT
You can access the FollowMyHealth Patient Portal offered by [facility] by registering at the following website: [email or web address]. By joining [employer]’s FollowMyHealth portal, you will also be able to view your health information using other applications (apps) compatible with our system.

## 2021-05-28 NOTE — ED PROVIDER NOTE - NSFOLLOWUPINSTRUCTIONS_ED_ALL_ED_FT
Please do not drink pure liquid. Drink thickened liquid/intake nectar. If you unable to tolerate oral intake, please return to er.     PLEASE FOLLOW UP WITH YOUR ENT. YOU MAY HAVE A MASS IN YOUR THROAT.     Follow up with your primary care doctor within the next 24-48 hours and bring copy of your results.  Return to the Emergency Department for worsening or persistent symptoms or any other concerns incl. FEVER, worsening cough, chest pain, shortness of breath, dizziness, inability to tolerate oral intake.  Rest, drink plenty of fluids.  Advance activity as tolerated.

## 2021-05-28 NOTE — ED PROVIDER NOTE - OBJECTIVE STATEMENT
87yo female with pmh HTN presents with difficult swallowing on the LEFT side of throat x 1 week. Pt reports she is able to swallow on the right, feels something is stuck on left or not moving. Pt seen by PMD who referred to ENT. Pt saw ENT yesterday and had scope and reports it was normal. ENT referred for MRI, but pt unable to wait 2 weeks, requesting meds to help with swallowing. Pt reports she Is unable to swallow and causing a dry cough. denies headache, dizziness, cp, sob, abd pain. denies any other weakness.     No fever/chills, No photophobia/eye pain/changes in vision, No ear pain/sore throat/+dysphagia, No chest pain/palpitations, no SOB/+cough, no wheeze/stridor, No abdominal pain, No N/V/D, no dysuria/frequency/discharge, No neck/back pain, no rash, no changes in neurological status/function.

## 2021-05-28 NOTE — ED PROVIDER NOTE - CLINICAL SUMMARY MEDICAL DECISION MAKING FREE TEXT BOX
PT well appearing. Lab values do not require emergent intervention. Pt declined transfer to [facility] for ENT and declined admission. Pt reports she will follow up with her ENT. advised pt to have thickened liquid intake until then.

## 2021-05-28 NOTE — ED ADULT TRIAGE NOTE - CHIEF COMPLAINT QUOTE
Cough x 1 week, palpates something on left side of neck, difficulty passing food                   Vaccinated

## 2021-05-29 PROBLEM — E11.9 TYPE 2 DIABETES MELLITUS WITHOUT COMPLICATIONS: Chronic | Status: ACTIVE | Noted: 2020-08-05

## 2021-05-29 PROBLEM — E78.5 HYPERLIPIDEMIA, UNSPECIFIED: Chronic | Status: ACTIVE | Noted: 2020-08-05

## 2021-10-21 NOTE — INPATIENT CERTIFICATION FOR MEDICARE PATIENTS - NS ICMP TWO DAYS INPATIENT
Anesthesia Volume In Cc: 5 Yes Did You Provide Opioid Counseling: No Repair Type: Complex Repair Suturegard Retention Suture: 2-0 Nylon Retention Suture Bite Size: 3 mm Length To Time In Minutes Device Was In Place: 10 Number Of Hemigard Strips Per Side: 1 Simple / Intermediate / Complex Repair - Final Wound Length In Cm: 4 Complex Requirements: Extensive Undermining Performed?: Yes Distance Of Undermining In Cm (Required): 1.5 Undermining Type: Entire Wound Debridement Text: The wound edges were debrided prior to proceeding with the closure to facilitate wound healing. Helical Rim Text: The closure involved the helical rim. Vermilion Border Text: The closure involved the vermilion border. Nostril Rim Text: The closure involved the nostril rim. Retention Suture Text: Retention sutures were placed to support the closure and prevent dehiscence. Primary Defect Length (In Cm): 1.4 Secondary Defect Length (In Cm): 0 Skin Substitute: EpiFix Micronized Type Of Previous Surgery (Optional- Ie Mohs Surgery): Mohs Date Of Previous Surgery (Optional): 10/21/21 Pre-Op Size Of Lesion Removed (Optional): 1.2 X Size Of Lesion In Cm (Optional): 0.8 Detail Level: Detailed Anesthesia Type: 1% lidocaine with epinephrine and a 1:10 solution of 8.4% sodium bicarbonate Hemostasis: Electrocoagulation Estimated Blood Loss (Cc): minimal Brow Lift Text: A midfrontal incision was made medially to the defect to allow access to the tissues just superior to the left eyebrow. Following careful dissection inferiorly in a supraperiosteal plane to the level of the left eyebrow, several 3-0 monocryl sutures were used to resuspend the eyebrow orbicularis oculi muscular unit to the superior frontal bone periosteum. This resulted in an appropriate reapproximation of static eyebrow symmetry and correction of the left brow ptosis. Deep Sutures: 4-0 Monocryl Epidermal Sutures: 6-0 Fast Absorbing Gut Epidermal Closure: running Wound Care: Vaseline Dressing: pressure dressing with telfa Unna Boot Text: An Unna boot was placed to help immobilize the limb and facilitate more rapid healing. Suturegard Intro: Intraoperative tissue expansion was performed, utilizing the SUTUREGARD device, in order to reduce wound tension. Suturegard Body: The suture ends were repeatedly re-tightened and re-clamped to achieve the desired tissue expansion. Hemigard Intro: Due to skin fragility and wound tension, it was decided to use HEMIGARD adhesive retention suture devices to permit a linear closure. The skin was cleaned and dried for a 6cm distance away from the wound. Excessive hair, if present, was removed to allow for adhesion. Hemigard Postcare Instructions: The HEMIGARD strips are to remain completely dry for at least 5-7 days. Graft Basting Suture (Optional): 5-0 Plain Gut Graft Donor Site Dermal Sutures (Optional): 4-0 Vicryl Graft Donor Site Epidermal Sutures (Optional): 6-0 Nylon Closure 2 Information: This tab is for additional flaps and grafts, including complex repair and grafts and complex repair and flaps. You can also specify a different location for the additional defect, if the location is the same you do not need to select a new one. We will insert the automated text for the repair you select below just as we do for solitary flaps and grafts. Please note that at this time if you select a location with a different insurance zone you will need to override the ICD10 and CPT if appropriate. Closure 3 Information: This tab is for additional flaps and grafts above and beyond our usual structured repairs.  Please note if you enter information here it will not currently bill and you will need to add the billing information manually. Closure 4 Information: This tab is for additional flaps and grafts above and beyond our usual structured repairs.  Please note if you enter information here it will not currently bill and you will need to add the billing information manually. Complex Repair Preamble Text (Leave Blank If You Do Not Want): The defect edges were debeveled with a #15 scalpel blade. Given the location of the defect a complex repair was deemed most appropriate.  Using a sterile surgical marker, Burow's triangles were drawn incorporating the defect and placing the expected incisions within the relaxed skin tension lines where possible.  The area thus outlined was incised to the appropriate tissue plane with a scalpel blade. Extensive wide undermining was performed. Intermediate Repair Preamble Text (Leave Blank If You Do Not Want): The defect edges were debeveled with a #15 scalpel blade. Given the location of the defect an intermediate repair was deemed most appropriate.  Using a sterile surgical marker, Burow's triangles were drawn incorporating the defect and placing the expected incisions within the relaxed skin tension lines where possible.  The area thus outlined was incised to the appropriate tissue plane with a scalpel blade. Flap Thinning Complex Repair Preamble Text (Leave Blank If You Do Not Want): An incision was made along the previous flap suture line. Undermining was performed beneath the flap and redundant tissue was removed to restore the normal contour of the skin. M-Plasty Complex Repair Preamble Text (Leave Blank If You Do Not Want): Extensive wide undermining was performed. M-Plasty Intermediate Repair Preamble Text (Leave Blank If You Do Not Want): Undermining was performed with blunt dissection. Non-Graft Cartilage Fenestration Text: The cartilage was fenestrated with a 2mm punch biopsy to help facilitate healing. Graft Cartilage Fenestration Text: The cartilage was fenestrated with a 2mm punch biopsy to help facilitate graft survival and healing. Preparation Of Recipient Site - Flap: The eschar was removed surgically with sharp dissection to facilitate appropriate wound healing of the following adjacent tissue rearrangement. Preparation Of Recipient Site - Graft: The eschar was removed surgically with sharp dissection to facilitate appropriate survival of the following graft. Preparation Of Recipient Site - Flap Takedown: The eschar and granulation tissue was removed surgically with sharp dissection to facilitate appropriate healing after division and inset of the proximal and distal interpolation flap. Secondary Intention Text (Leave Blank If You Do Not Want): The defect will heal with secondary intention. No Repair - Repaired With Adjacent Surgical Defect Text (Leave Blank If You Do Not Want): After obtaining clear surgical margins the defect was repaired concurrently with another surgical defect which was in close approximation. Referred To Oculoplastics For Closure Text (Leave Blank If You Do Not Want): After obtaining clear surgical margins the patient was sent to oculoplastics for surgical repair.  The patient understands they will receive post-surgical care and follow-up from the referring physician's office. Referred To Otolaryngology For Closure Text (Leave Blank If You Do Not Want): After obtaining clear surgical margins the patient was sent to otolaryngology for surgical repair.  The patient understands they will receive post-surgical care and follow-up from the referring physician's office. Referred To Plastics For Closure Text (Leave Blank If You Do Not Want): After obtaining clear surgical margins the patient was sent to plastics for surgical repair. The patient understands they will receive post-surgical care and follow-up from the referring physician's office. Referred To Asc For Closure Text (Leave Blank If You Do Not Want): After obtaining clear surgical margins the patient was sent to an ASC for surgical repair.  The patient understands they will receive post-surgical care and follow-up from the ASC physician. Referred To Mid-Level For Closure Text (Leave Blank If You Do Not Want): After obtaining clear surgical margins the patient was sent to a mid-level provider for surgical repair.  The patient understands they will receive post-surgical care and follow-up from the mid-level provider. Repair Performed By Another Provider Text (Leave Blank If You Do Not Want): After obtaining clear surgical margins the defect was repaired by another provider. Advancement Flap (Single) Text: The defect edges were debeveled with a #15 scalpel blade.  Given the location of the defect and the proximity to free margins a single advancement flap was deemed most appropriate.  Using a sterile surgical marker, an appropriate advancement flap was drawn incorporating the defect and placing the expected incisions within the relaxed skin tension lines where possible.    The area thus outlined was incised deep to adipose tissue with a #15 scalpel blade.  The skin margins were undermined to an appropriate distance in all directions utilizing iris scissors. Advancement Flap (Double) Text: The defect edges were debeveled with a #15 scalpel blade.  Given the location of the defect and the proximity to free margins a double advancement flap was deemed most appropriate.  Using a sterile surgical marker, the appropriate advancement flaps were drawn incorporating the defect and placing the expected incisions within the relaxed skin tension lines where possible.    The area thus outlined was incised deep to adipose tissue with a #15 scalpel blade.  The skin margins were undermined to an appropriate distance in all directions utilizing iris scissors. Burow's Advancement Flap Text: The defect edges were debeveled with a #15 scalpel blade.  Given the location of the defect and the proximity to free margins a Burow's advancement flap was deemed most appropriate.  Using a sterile surgical marker, the appropriate advancement flap was drawn incorporating the defect and placing the expected incisions within the relaxed skin tension lines where possible.    The area thus outlined was incised deep to adipose tissue with a #15 scalpel blade.  The skin margins were undermined to an appropriate distance in all directions utilizing iris scissors. Chonodrocutaneous Helical Advancement Flap Text: The defect edges were debeveled with a #15 scalpel blade.  Given the location of the defect and the proximity to free margins a chondrocutaneous helical advancement flap was deemed most appropriate.  Using a sterile surgical marker, the appropriate advancement flap was drawn incorporating the defect and placing the expected incisions within the relaxed skin tension lines where possible.    The area thus outlined was incised deep to adipose tissue with a #15 scalpel blade.  The skin margins were undermined to an appropriate distance in all directions utilizing iris scissors. Crescentic Advancement Flap Text: The defect edges were debeveled with a #15 scalpel blade.  Given the location of the defect and the proximity to free margins a crescentic advancement flap was deemed most appropriate.  Using a sterile surgical marker, the appropriate advancement flap was drawn incorporating the defect and placing the expected incisions within the relaxed skin tension lines where possible.    The area thus outlined was incised deep to adipose tissue with a #15 scalpel blade.  The skin margins were undermined to an appropriate distance in all directions utilizing iris scissors. A-T Advancement Flap Text: The defect edges were debeveled with a #15 scalpel blade.  Given the location of the defect, shape of the defect and the proximity to free margins an A-T advancement flap was deemed most appropriate.  Using a sterile surgical marker, an appropriate advancement flap was drawn incorporating the defect and placing the expected incisions within the relaxed skin tension lines where possible.    The area thus outlined was incised deep to adipose tissue with a #15 scalpel blade.  The skin margins were undermined to an appropriate distance in all directions utilizing iris scissors. O-T Advancement Flap Text: The defect edges were debeveled with a #15 scalpel blade.  Given the location of the defect, shape of the defect and the proximity to free margins an O-T advancement flap was deemed most appropriate.  Using a sterile surgical marker, an appropriate advancement flap was drawn incorporating the defect and placing the expected incisions within the relaxed skin tension lines where possible.    The area thus outlined was incised deep to adipose tissue with a #15 scalpel blade.  The skin margins were undermined to an appropriate distance in all directions utilizing iris scissors. O-L Flap Text: The defect edges were debeveled with a #15 scalpel blade.  Given the location of the defect, shape of the defect and the proximity to free margins an O-L flap was deemed most appropriate.  Using a sterile surgical marker, an appropriate advancement flap was drawn incorporating the defect and placing the expected incisions within the relaxed skin tension lines where possible.    The area thus outlined was incised deep to adipose tissue with a #15 scalpel blade.  The skin margins were undermined to an appropriate distance in all directions utilizing iris scissors. O-Z Flap Text: The defect edges were debeveled with a #15 scalpel blade.  Given the location of the defect, shape of the defect and the proximity to free margins an O-Z flap was deemed most appropriate.  Using a sterile surgical marker, an appropriate transposition flap was drawn incorporating the defect and placing the expected incisions within the relaxed skin tension lines where possible. The area thus outlined was incised deep to adipose tissue with a #15 scalpel blade.  The skin margins were undermined to an appropriate distance in all directions utilizing iris scissors. Double O-Z Flap Text: The defect edges were debeveled with a #15 scalpel blade.  Given the location of the defect, shape of the defect and the proximity to free margins a Double O-Z flap was deemed most appropriate.  Using a sterile surgical marker, an appropriate transposition flap was drawn incorporating the defect and placing the expected incisions within the relaxed skin tension lines where possible. The area thus outlined was incised deep to adipose tissue with a #15 scalpel blade.  The skin margins were undermined to an appropriate distance in all directions utilizing iris scissors. V-Y Flap Text: The defect edges were debeveled with a #15 scalpel blade.  Given the location of the defect, shape of the defect and the proximity to free margins a V-Y flap was deemed most appropriate.  Using a sterile surgical marker, an appropriate advancement flap was drawn incorporating the defect and placing the expected incisions within the relaxed skin tension lines where possible.    The area thus outlined was incised deep to adipose tissue with a #15 scalpel blade.  The skin margins were undermined to an appropriate distance in all directions utilizing iris scissors. Advancement-Rotation Flap Text: The defect edges were debeveled with a #15 scalpel blade.  Given the location of the defect, shape of the defect and the proximity to free margins an advancement-rotation flap was deemed most appropriate.  Using a sterile surgical marker, an appropriate advancement flap was drawn incorporating the defect and placing the expected incisions within the relaxed skin tension lines where possible.    The area thus outlined was incised deep to adipose tissue with a #15 scalpel blade.  The skin margins were undermined to an appropriate distance in all directions utilizing iris scissors. Mercedes Flap Text: The defect edges were debeveled with a #15 scalpel blade.  Given the location of the defect, shape of the defect and the proximity to free margins a Mercedes flap was deemed most appropriate.  Using a sterile surgical marker, an appropriate advancement flap was drawn incorporating the defect and placing the expected incisions within the relaxed skin tension lines where possible. The area thus outlined was incised deep to adipose tissue with a #15 scalpel blade.  The skin margins were undermined to an appropriate distance in all directions utilizing iris scissors. Modified Advancement Flap Text: The defect edges were debeveled with a #15 scalpel blade.  Given the location of the defect, shape of the defect and the proximity to free margins a modified advancement flap was deemed most appropriate.  Using a sterile surgical marker, an appropriate advancement flap was drawn incorporating the defect and placing the expected incisions within the relaxed skin tension lines where possible.    The area thus outlined was incised deep to adipose tissue with a #15 scalpel blade.  The skin margins were undermined to an appropriate distance in all directions utilizing iris scissors. Mucosal Advancement Flap Text: Given the location of the defect, shape of the defect and the proximity to free margins a mucosal advancement flap was deemed most appropriate. Incisions were made with a 15 blade scalpel in the appropriate fashion along the cutaneous vermillion border and the mucosal lip. The remaining actinically damaged mucosal tissue was excised.  The mucosal advancement flap was then elevated to the gingival sulcus with care taken to preserve the neurovascular structures and advanced into the primary defect. Care was taken to ensure that precise realignment of the vermillion border was achieved. Peng Advancement Flap Text: The defect edges were debeveled with a #15 scalpel blade.  Given the location of the defect, shape of the defect and the proximity to free margins a Peng advancement flap was deemed most appropriate.  Using a sterile surgical marker, an appropriate advancement flap was drawn incorporating the defect and placing the expected incisions within the relaxed skin tension lines where possible. The area thus outlined was incised deep to adipose tissue with a #15 scalpel blade.  The skin margins were undermined to an appropriate distance in all directions utilizing iris scissors. Hatchet Flap Text: The defect edges were debeveled with a #15 scalpel blade.  Given the location of the defect, shape of the defect and the proximity to free margins a hatchet flap was deemed most appropriate.  Using a sterile surgical marker, an appropriate hatchet flap was drawn incorporating the defect and placing the expected incisions within the relaxed skin tension lines where possible.    The area thus outlined was incised deep to adipose tissue with a #15 scalpel blade.  The skin margins were undermined to an appropriate distance in all directions utilizing iris scissors. Rotation Flap Text: The defect edges were debeveled with a #15 scalpel blade.  Given the location of the defect, shape of the defect and the proximity to free margins a rotation flap was deemed most appropriate.  Using a sterile surgical marker, an appropriate rotation flap was drawn incorporating the defect and placing the expected incisions within the relaxed skin tension lines where possible.    The area thus outlined was incised deep to adipose tissue with a #15 scalpel blade.  The skin margins were undermined to an appropriate distance in all directions utilizing iris scissors. Spiral Flap Text: The defect edges were debeveled with a #15 scalpel blade.  Given the location of the defect, shape of the defect and the proximity to free margins a spiral flap was deemed most appropriate.  Using a sterile surgical marker, an appropriate rotation flap was drawn incorporating the defect and placing the expected incisions within the relaxed skin tension lines where possible. The area thus outlined was incised deep to adipose tissue with a #15 scalpel blade.  The skin margins were undermined to an appropriate distance in all directions utilizing iris scissors. Staged Advancement Flap Text: The defect edges were debeveled with a #15 scalpel blade.  Given the location of the defect, shape of the defect and the proximity to free margins a staged advancement flap was deemed most appropriate.  Using a sterile surgical marker, an appropriate advancement flap was drawn incorporating the defect and placing the expected incisions within the relaxed skin tension lines where possible. The area thus outlined was incised deep to adipose tissue with a #15 scalpel blade.  The skin margins were undermined to an appropriate distance in all directions utilizing iris scissors. Star Wedge Flap Text: The defect edges were debeveled with a #15 scalpel blade.  Given the location of the defect, shape of the defect and the proximity to free margins a star wedge flap was deemed most appropriate.  Using a sterile surgical marker, an appropriate rotation flap was drawn incorporating the defect and placing the expected incisions within the relaxed skin tension lines where possible. The area thus outlined was incised deep to adipose tissue with a #15 scalpel blade.  The skin margins were undermined to an appropriate distance in all directions utilizing iris scissors. Transposition Flap Text: The defect edges were debeveled with a #15 scalpel blade.  Given the location of the defect and the proximity to free margins a transposition flap was deemed most appropriate.  Using a sterile surgical marker, an appropriate transposition flap was drawn incorporating the defect.    The area thus outlined was incised deep to adipose tissue with a #15 scalpel blade.  The skin margins were undermined to an appropriate distance in all directions utilizing iris scissors. Muscle Hinge Flap Text: The defect edges were debeveled with a #15 scalpel blade.  Given the size, depth and location of the defect and the proximity to free margins a muscle hinge flap was deemed most appropriate.  Using a sterile surgical marker, an appropriate hinge flap was drawn incorporating the defect. The area thus outlined was incised with a #15 scalpel blade.  The skin margins were undermined to an appropriate distance in all directions utilizing iris scissors. Mustarde Flap Text: The defect edges were debeveled with a #15 scalpel blade.  Given the size, depth and location of the defect and the proximity to free margins a Mustarde flap was deemed most appropriate.  Using a sterile surgical marker, an appropriate flap was drawn incorporating the defect. The area thus outlined was incised with a #15 scalpel blade.  The skin margins were undermined to an appropriate distance in all directions utilizing iris scissors. Nasal Turnover Hinge Flap Text: The defect edges were debeveled with a #15 scalpel blade.  Given the size, depth, location of the defect and the defect being full thickness a nasal turnover hinge flap was deemed most appropriate.  Using a sterile surgical marker, an appropriate hinge flap was drawn incorporating the defect. The area thus outlined was incised with a #15 scalpel blade. The flap was designed to recreate the nasal mucosal lining and the alar rim. The skin margins were undermined to an appropriate distance in all directions utilizing iris scissors. Nasalis-Muscle-Based Myocutaneous Island Pedicle Flap Text: Using a #15 blade, an incision was made around the donor flap to the level of the nasalis muscle. Wide lateral undermining was then performed in both the subcutaneous plane above the nasalis muscle, and in a submuscular plane just above periosteum. This allowed the formation of a free nasalis muscle axial pedicle (based on the angular artery) which was still attached to the actual cutaneous flap, increasing its mobility and vascular viability. Hemostasis was obtained with pinpoint electrocoagulation. The flap was mobilized into position and the pivotal anchor points positioned and stabilized with buried interrupted sutures. Subcutaneous and dermal tissues were closed in a multilayered fashion with sutures. Tissue redundancies were excised, and the epidermal edges were apposed without significant tension and sutured with sutures. Orbicularis Oris Muscle Flap Text: The defect edges were debeveled with a #15 scalpel blade.  Given that the defect affected the competency of the oral sphincter an orbicularis oris muscle flap was deemed most appropriate to restore this competency and normal muscle function.  Using a sterile surgical marker, an appropriate flap was drawn incorporating the defect. The area thus outlined was incised with a #15 scalpel blade. Melolabial Transposition Flap Text: The defect edges were debeveled with a #15 scalpel blade.  Given the location of the defect and the proximity to free margins a melolabial flap was deemed most appropriate.  Using a sterile surgical marker, an appropriate melolabial transposition flap was drawn incorporating the defect.    The area thus outlined was incised deep to adipose tissue with a #15 scalpel blade.  The skin margins were undermined to an appropriate distance in all directions utilizing iris scissors. Rhombic Flap Text: The defect edges were debeveled with a #15 scalpel blade.  Given the location of the defect and the proximity to free margins a rhombic flap was deemed most appropriate.  Using a sterile surgical marker, an appropriate rhombic flap was drawn incorporating the defect.    The area thus outlined was incised deep to adipose tissue with a #15 scalpel blade.  The skin margins were undermined to an appropriate distance in all directions utilizing iris scissors. Rhomboid Transposition Flap Text: The defect edges were debeveled with a #15 scalpel blade.  Given the location of the defect and the proximity to free margins a rhomboid transposition flap was deemed most appropriate.  Using a sterile surgical marker, an appropriate rhomboid flap was drawn incorporating the defect.    The area thus outlined was incised deep to adipose tissue with a #15 scalpel blade.  The skin margins were undermined to an appropriate distance in all directions utilizing iris scissors. Bi-Rhombic Flap Text: The defect edges were debeveled with a #15 scalpel blade.  Given the location of the defect and the proximity to free margins a bi-rhombic flap was deemed most appropriate.  Using a sterile surgical marker, an appropriate rhombic flap was drawn incorporating the defect. The area thus outlined was incised deep to adipose tissue with a #15 scalpel blade.  The skin margins were undermined to an appropriate distance in all directions utilizing iris scissors. Helical Rim Advancement Flap Text: The defect edges were debeveled with a #15 blade scalpel.  Given the location of the defect and the proximity to free margins (helical rim) a double helical rim advancement flap was deemed most appropriate.  Using a sterile surgical marker, the appropriate advancement flaps were drawn incorporating the defect and placing the expected incisions between the helical rim and antihelix where possible.  The area thus outlined was incised through and through with a #15 scalpel blade.  With a skin hook and iris scissors, the flaps were gently and sharply undermined and freed up. Bilateral Helical Rim Advancement Flap Text: The defect edges were debeveled with a #15 blade scalpel.  Given the location of the defect and the proximity to free margins (helical rim) a bilateral helical rim advancement flap was deemed most appropriate.  Using a sterile surgical marker, the appropriate advancement flaps were drawn incorporating the defect and placing the expected incisions between the helical rim and antihelix where possible.  The area thus outlined was incised through and through with a #15 scalpel blade.  With a skin hook and iris scissors, the flaps were gently and sharply undermined and freed up. Ear Star Wedge Flap Text: The defect edges were debeveled with a #15 blade scalpel.  Given the location of the defect and the proximity to free margins (helical rim) an ear star wedge flap was deemed most appropriate.  Using a sterile surgical marker, the appropriate flap was drawn incorporating the defect and placing the expected incisions between the helical rim and antihelix where possible.  The area thus outlined was incised through and through with a #15 scalpel blade. Banner Transposition Flap Text: The defect edges were debeveled with a #15 scalpel blade.  Given the location of the defect and the proximity to free margins a Banner transposition flap was deemed most appropriate.  Using a sterile surgical marker, an appropriate flap drawn around the defect. The area thus outlined was incised deep to adipose tissue with a #15 scalpel blade.  The skin margins were undermined to an appropriate distance in all directions utilizing iris scissors. Bilobed Flap Text: The defect edges were debeveled with a #15 scalpel blade.  Given the location of the defect and the proximity to free margins a bilobe flap was deemed most appropriate.  Using a sterile surgical marker, an appropriate bilobe flap drawn around the defect.    The area thus outlined was incised deep to adipose tissue with a #15 scalpel blade.  The skin margins were undermined to an appropriate distance in all directions utilizing iris scissors. Bilobed Transposition Flap Text: The defect edges were debeveled with a #15 scalpel blade.  Given the location of the defect and the proximity to free margins a bilobed transposition flap was deemed most appropriate.  Using a sterile surgical marker, an appropriate bilobe flap drawn around the defect.    The area thus outlined was incised deep to adipose tissue with a #15 scalpel blade.  The skin margins were undermined to an appropriate distance in all directions utilizing iris scissors. Trilobed Flap Text: The defect edges were debeveled with a #15 scalpel blade.  Given the location of the defect and the proximity to free margins a trilobed flap was deemed most appropriate.  Using a sterile surgical marker, an appropriate trilobed flap drawn around the defect.    The area thus outlined was incised deep to adipose tissue with a #15 scalpel blade.  The skin margins were undermined to an appropriate distance in all directions utilizing iris scissors. Dorsal Nasal Flap Text: The defect edges were debeveled with a #15 scalpel blade.  Given the location of the defect and the proximity to free margins a dorsal nasal flap was deemed most appropriate.  Using a sterile surgical marker, an appropriate dorsal nasal flap was drawn around the defect.    The area thus outlined was incised deep to adipose tissue with a #15 scalpel blade.  The skin margins were undermined to an appropriate distance in all directions utilizing iris scissors. Island Pedicle Flap Text: The defect edges were debeveled with a #15 scalpel blade.  Given the location of the defect, shape of the defect and the proximity to free margins an island pedicle advancement flap was deemed most appropriate.  Using a sterile surgical marker, an appropriate advancement flap was drawn incorporating the defect, outlining the appropriate donor tissue and placing the expected incisions within the relaxed skin tension lines where possible.    The area thus outlined was incised deep to adipose tissue with a #15 scalpel blade.  The skin margins were undermined to an appropriate distance in all directions around the primary defect and laterally outward around the island pedicle utilizing iris scissors.  There was minimal undermining beneath the pedicle flap. Island Pedicle Flap With Canthal Suspension Text: The defect edges were debeveled with a #15 scalpel blade.  Given the location of the defect, shape of the defect and the proximity to free margins an island pedicle advancement flap was deemed most appropriate.  Using a sterile surgical marker, an appropriate advancement flap was drawn incorporating the defect, outlining the appropriate donor tissue and placing the expected incisions within the relaxed skin tension lines where possible. The area thus outlined was incised deep to adipose tissue with a #15 scalpel blade.  The skin margins were undermined to an appropriate distance in all directions around the primary defect and laterally outward around the island pedicle utilizing iris scissors.  There was minimal undermining beneath the pedicle flap. A suspension suture was placed in the canthal tendon to prevent tension and prevent ectropion. Alar Island Pedicle Flap Text: The defect edges were debeveled with a #15 scalpel blade.  Given the location of the defect, shape of the defect and the proximity to the alar rim an island pedicle advancement flap was deemed most appropriate.  Using a sterile surgical marker, an appropriate advancement flap was drawn incorporating the defect, outlining the appropriate donor tissue and placing the expected incisions within the nasal ala running parallel to the alar rim. The area thus outlined was incised with a #15 scalpel blade.  The skin margins were undermined minimally to an appropriate distance in all directions around the primary defect and laterally outward around the island pedicle utilizing iris scissors.  There was minimal undermining beneath the pedicle flap. Double Island Pedicle Flap Text: The defect edges were debeveled with a #15 scalpel blade.  Given the location of the defect, shape of the defect and the proximity to free margins a double island pedicle advancement flap was deemed most appropriate.  Using a sterile surgical marker, an appropriate advancement flap was drawn incorporating the defect, outlining the appropriate donor tissue and placing the expected incisions within the relaxed skin tension lines where possible.    The area thus outlined was incised deep to adipose tissue with a #15 scalpel blade.  The skin margins were undermined to an appropriate distance in all directions around the primary defect and laterally outward around the island pedicle utilizing iris scissors.  There was minimal undermining beneath the pedicle flap. Island Pedicle Flap-Requiring Vessel Identification Text: The defect edges were debeveled with a #15 scalpel blade.  Given the location of the defect, shape of the defect and the proximity to free margins an island pedicle advancement flap was deemed most appropriate.  Using a sterile surgical marker, an appropriate advancement flap was drawn, based on the axial vessel mentioned above, incorporating the defect, outlining the appropriate donor tissue and placing the expected incisions within the relaxed skin tension lines where possible.    The area thus outlined was incised deep to adipose tissue with a #15 scalpel blade.  The skin margins were undermined to an appropriate distance in all directions around the primary defect and laterally outward around the island pedicle utilizing iris scissors.  There was minimal undermining beneath the pedicle flap. Keystone Flap Text: The defect edges were debeveled with a #15 scalpel blade.  Given the location of the defect, shape of the defect a keystone flap was deemed most appropriate.  Using a sterile surgical marker, an appropriate keystone flap was drawn incorporating the defect, outlining the appropriate donor tissue and placing the expected incisions within the relaxed skin tension lines where possible. The area thus outlined was incised deep to adipose tissue with a #15 scalpel blade.  The skin margins were undermined to an appropriate distance in all directions around the primary defect and laterally outward around the flap utilizing iris scissors. O-T Plasty Text: The defect edges were debeveled with a #15 scalpel blade.  Given the location of the defect, shape of the defect and the proximity to free margins an O-T plasty was deemed most appropriate.  Using a sterile surgical marker, an appropriate O-T plasty was drawn incorporating the defect and placing the expected incisions within the relaxed skin tension lines where possible.    The area thus outlined was incised deep to adipose tissue with a #15 scalpel blade.  The skin margins were undermined to an appropriate distance in all directions utilizing iris scissors. O-Z Plasty Text: The defect edges were debeveled with a #15 scalpel blade.  Given the location of the defect, shape of the defect and the proximity to free margins an O-Z plasty (double transposition flap) was deemed most appropriate.  Using a sterile surgical marker, the appropriate transposition flaps were drawn incorporating the defect and placing the expected incisions within the relaxed skin tension lines where possible.    The area thus outlined was incised deep to adipose tissue with a #15 scalpel blade.  The skin margins were undermined to an appropriate distance in all directions utilizing iris scissors.  Hemostasis was achieved with electrocautery.  The flaps were then transposed into place, one clockwise and the other counterclockwise, and anchored with interrupted buried subcutaneous sutures. Double O-Z Plasty Text: The defect edges were debeveled with a #15 scalpel blade.  Given the location of the defect, shape of the defect and the proximity to free margins a Double O-Z plasty (double transposition flap) was deemed most appropriate.  Using a sterile surgical marker, the appropriate transposition flaps were drawn incorporating the defect and placing the expected incisions within the relaxed skin tension lines where possible. The area thus outlined was incised deep to adipose tissue with a #15 scalpel blade.  The skin margins were undermined to an appropriate distance in all directions utilizing iris scissors.  Hemostasis was achieved with electrocautery.  The flaps were then transposed into place, one clockwise and the other counterclockwise, and anchored with interrupted buried subcutaneous sutures. V-Y Plasty Text: The defect edges were debeveled with a #15 scalpel blade.  Given the location of the defect, shape of the defect and the proximity to free margins an V-Y advancement flap was deemed most appropriate.  Using a sterile surgical marker, an appropriate advancement flap was drawn incorporating the defect and placing the expected incisions within the relaxed skin tension lines where possible.    The area thus outlined was incised deep to adipose tissue with a #15 scalpel blade.  The skin margins were undermined to an appropriate distance in all directions utilizing iris scissors. H Plasty Text: Given the location of the defect, shape of the defect and the proximity to free margins a H-plasty was deemed most appropriate for repair.  Using a sterile surgical marker, the appropriate advancement arms of the H-plasty were drawn incorporating the defect and placing the expected incisions within the relaxed skin tension lines where possible. The area thus outlined was incised deep to adipose tissue with a #15 scalpel blade. The skin margins were undermined to an appropriate distance in all directions utilizing iris scissors.  The opposing advancement arms were then advanced into place in opposite direction and anchored with interrupted buried subcutaneous sutures. W Plasty Text: The lesion was extirpated to the level of the fat with a #15 scalpel blade.  Given the location of the defect, shape of the defect and the proximity to free margins a W-plasty was deemed most appropriate for repair.  Using a sterile surgical marker, the appropriate transposition arms of the W-plasty were drawn incorporating the defect and placing the expected incisions within the relaxed skin tension lines where possible.    The area thus outlined was incised deep to adipose tissue with a #15 scalpel blade.  The skin margins were undermined to an appropriate distance in all directions utilizing iris scissors.  The opposing transposition arms were then transposed into place in opposite direction and anchored with interrupted buried subcutaneous sutures. Z Plasty Text: The lesion was extirpated to the level of the fat with a #15 scalpel blade.  Given the location of the defect, shape of the defect and the proximity to free margins a Z-plasty was deemed most appropriate for repair.  Using a sterile surgical marker, the appropriate transposition arms of the Z-plasty were drawn incorporating the defect and placing the expected incisions within the relaxed skin tension lines where possible.    The area thus outlined was incised deep to adipose tissue with a #15 scalpel blade.  The skin margins were undermined to an appropriate distance in all directions utilizing iris scissors.  The opposing transposition arms were then transposed into place in opposite direction and anchored with interrupted buried subcutaneous sutures. Zygomaticofacial Flap Text: Given the location of the defect, shape of the defect and the proximity to free margins a zygomaticofacial flap was deemed most appropriate for repair.  Using a sterile surgical marker, the appropriate flap was drawn incorporating the defect and placing the expected incisions within the relaxed skin tension lines where possible. The area thus outlined was incised deep to adipose tissue with a #15 scalpel blade with preservation of a vascular pedicle.  The skin margins were undermined to an appropriate distance in all directions utilizing iris scissors.  The flap was then placed into the defect and anchored with interrupted buried subcutaneous sutures. Cheek Interpolation Flap Text: A decision was made to reconstruct the defect utilizing an interpolation axial flap and a staged reconstruction.  A telfa template was made of the defect.  This telfa template was then used to outline the Cheek Interpolation flap.  The donor area for the pedicle flap was then injected with anesthesia.  The flap was excised through the skin and subcutaneous tissue down to the layer of the underlying musculature.  The interpolation flap was carefully excised within this deep plane to maintain its blood supply.  The edges of the donor site were undermined.   The donor site was closed in a primary fashion.  The pedicle was then rotated into position and sutured.  Once the tube was sutured into place, adequate blood supply was confirmed with blanching and refill.  The pedicle was then wrapped with xeroform gauze and dressed appropriately with a telfa and gauze bandage to ensure continued blood supply and protect the attached pedicle. Cheek-To-Nose Interpolation Flap Text: A decision was made to reconstruct the defect utilizing an interpolation axial flap and a staged reconstruction.  A telfa template was made of the defect.  This telfa template was then used to outline the Cheek-To-Nose Interpolation flap.  The donor area for the pedicle flap was then injected with anesthesia.  The flap was excised through the skin and subcutaneous tissue down to the layer of the underlying musculature.  The interpolation flap was carefully excised within this deep plane to maintain its blood supply.  The edges of the donor site were undermined.   The donor site was closed in a primary fashion.  The pedicle was then rotated into position and sutured.  Once the tube was sutured into place, adequate blood supply was confirmed with blanching and refill.  The pedicle was then wrapped with xeroform gauze and dressed appropriately with a telfa and gauze bandage to ensure continued blood supply and protect the attached pedicle. Interpolation Flap Text: A decision was made to reconstruct the defect utilizing an interpolation axial flap and a staged reconstruction.  A telfa template was made of the defect.  This telfa template was then used to outline the interpolation flap.  The donor area for the pedicle flap was then injected with anesthesia.  The flap was excised through the skin and subcutaneous tissue down to the layer of the underlying musculature.  The interpolation flap was carefully excised within this deep plane to maintain its blood supply.  The edges of the donor site were undermined.   The donor site was closed in a primary fashion.  The pedicle was then rotated into position and sutured.  Once the tube was sutured into place, adequate blood supply was confirmed with blanching and refill.  The pedicle was then wrapped with xeroform gauze and dressed appropriately with a telfa and gauze bandage to ensure continued blood supply and protect the attached pedicle. Melolabial Interpolation Flap Text: A decision was made to reconstruct the defect utilizing an interpolation axial flap and a staged reconstruction.  A telfa template was made of the defect.  This telfa template was then used to outline the melolabial interpolation flap.  The donor area for the pedicle flap was then injected with anesthesia.  The flap was excised through the skin and subcutaneous tissue down to the layer of the underlying musculature.  The pedicle flap was carefully excised within this deep plane to maintain its blood supply.  The edges of the donor site were undermined.   The donor site was closed in a primary fashion.  The pedicle was then rotated into position and sutured.  Once the tube was sutured into place, adequate blood supply was confirmed with blanching and refill.  The pedicle was then wrapped with xeroform gauze and dressed appropriately with a telfa and gauze bandage to ensure continued blood supply and protect the attached pedicle. Mastoid Interpolation Flap Text: A decision was made to reconstruct the defect utilizing an interpolation axial flap and a staged reconstruction.  A telfa template was made of the defect.  This telfa template was then used to outline the mastoid interpolation flap.  The donor area for the pedicle flap was then injected with anesthesia.  The flap was excised through the skin and subcutaneous tissue down to the layer of the underlying musculature.  The pedicle flap was carefully excised within this deep plane to maintain its blood supply.  The edges of the donor site were undermined.   The donor site was closed in a primary fashion.  The pedicle was then rotated into position and sutured.  Once the tube was sutured into place, adequate blood supply was confirmed with blanching and refill.  The pedicle was then wrapped with xeroform gauze and dressed appropriately with a telfa and gauze bandage to ensure continued blood supply and protect the attached pedicle. Posterior Auricular Interpolation Flap Text: A decision was made to reconstruct the defect utilizing an interpolation axial flap and a staged reconstruction.  A telfa template was made of the defect.  This telfa template was then used to outline the posterior auricular interpolation flap.  The donor area for the pedicle flap was then injected with anesthesia.  The flap was excised through the skin and subcutaneous tissue down to the layer of the underlying musculature.  The pedicle flap was carefully excised within this deep plane to maintain its blood supply.  The edges of the donor site were undermined.   The donor site was closed in a primary fashion.  The pedicle was then rotated into position and sutured.  Once the tube was sutured into place, adequate blood supply was confirmed with blanching and refill.  The pedicle was then wrapped with xeroform gauze and dressed appropriately with a telfa and gauze bandage to ensure continued blood supply and protect the attached pedicle. Paramedian Forehead Flap Text: A decision was made to reconstruct the defect utilizing an interpolation axial flap and a staged reconstruction.  A telfa template was made of the defect.  This telfa template was then used to outline the paramedian forehead pedicle flap.  The donor area for the pedicle flap was then injected with anesthesia.  The flap was excised through the skin and subcutaneous tissue down to the layer of the underlying musculature.  The pedicle flap was carefully excised within this deep plane to maintain its blood supply.  The edges of the donor site were undermined.   The donor site was closed in a primary fashion.  The pedicle was then rotated into position and sutured.  Once the tube was sutured into place, adequate blood supply was confirmed with blanching and refill.  The pedicle was then wrapped with xeroform gauze and dressed appropriately with a telfa and gauze bandage to ensure continued blood supply and protect the attached pedicle. Cheiloplasty (Less Than 50%) Text: A decision was made to reconstruct the defect with a  cheiloplasty.  The defect was undermined extensively.  Additional obicularis oris muscle was excised with a 15 blade scalpel.  The defect was converted into a full thickness wedge, of less than 50% of the vertical height of the lip, to facilite a better cosmetic result.  Small vessels were then tied off with 5-0 monocyrl. The obicularis oris, superficial fascia, adipose and dermis were then reapproximated.  After the deeper layers were approximated the epidermis was reapproximated with particular care given to realign the vermillion border. Cheiloplasty (Complex) Text: A decision was made to reconstruct the defect with a  cheiloplasty.  The defect was undermined extensively.  Additional obicularis oris muscle was excised with a 15 blade scalpel.  The defect was converted into a full thickness wedge to facilite a better cosmetic result.  Small vessels were then tied off with 5-0 monocyrl. The obicularis oris, superficial fascia, adipose and dermis were then reapproximated.  After the deeper layers were approximated the epidermis was reapproximated with particular care given to realign the vermillion border. Ear Wedge Repair Text: A wedge excision was completed by carrying down an excision through the full thickness of the ear and cartilage with an inward facing Burow's triangle. The wound was then closed in a layered fashion. Full Thickness Lip Wedge Repair (Flap) Text: Given the location of the defect and the proximity to free margins a full thickness wedge repair was deemed most appropriate.  Using a sterile surgical marker, the appropriate repair was drawn incorporating the defect and placing the expected incisions perpendicular to the vermilion border.  The vermilion border was also meticulously outlined to ensure appropriate reapproximation during the repair.  The area thus outlined was incised through and through with a #15 scalpel blade.  The muscularis and dermis were reaproximated with deep sutures following hemostasis. Care was taken to realign the vermilion border before proceeding with the superficial closure.  Once the vermilion was realigned the superfical and mucosal closure was finished. Ftsg Text: The defect edges were debeveled with a #15 scalpel blade.  Given the location of the defect, shape of the defect and the proximity to free margins a full thickness skin graft was deemed most appropriate.  Using a sterile surgical marker, the primary defect shape was transferred to the donor site. The area thus outlined was incised deep to adipose tissue with a #15 scalpel blade.  The harvested graft was then trimmed of adipose tissue until only dermis and epidermis was left.  The skin margins of the secondary defect were undermined to an appropriate distance in all directions utilizing iris scissors.  The secondary defect was closed with interrupted buried subcutaneous sutures.  The skin edges were then re-apposed with running  sutures.  The skin graft was then placed in the primary defect and oriented appropriately. Split-Thickness Skin Graft Text: The defect edges were debeveled with a #15 scalpel blade.  Given the location of the defect, shape of the defect and the proximity to free margins a split thickness skin graft was deemed most appropriate.  Using a sterile surgical marker, the primary defect shape was transferred to the donor site. The split thickness graft was then harvested.  The skin graft was then placed in the primary defect and oriented appropriately. Burow's Graft Text: The defect edges were debeveled with a #15 scalpel blade.  Given the location of the defect, shape of the defect, the proximity to free margins and the presence of a standing cone deformity a Burow's skin graft was deemed most appropriate. The standing cone was removed and this tissue was then trimmed to the shape of the primary defect. The adipose tissue was also removed until only dermis and epidermis were left.  The skin margins of the secondary defect were undermined to an appropriate distance in all directions utilizing iris scissors.  The secondary defect was closed with interrupted buried subcutaneous sutures.  The skin edges were then re-apposed with running  sutures.  The skin graft was then placed in the primary defect and oriented appropriately. Cartilage Graft Text: The defect edges were debeveled with a #15 scalpel blade.  Given the location of the defect, shape of the defect, the fact the defect involved a full thickness cartilage defect a cartilage graft was deemed most appropriate.  An appropriate donor site was identified, cleansed, and anesthetized. The cartilage graft was then harvested and transferred to the recipient site, oriented appropriately and then sutured into place.  The secondary defect was then repaired using a primary closure. Composite Graft Text: The defect edges were debeveled with a #15 scalpel blade.  Given the location of the defect, shape of the defect, the proximity to free margins and the fact the defect was full thickness a composite graft was deemed most appropriate.  The defect was outline and then transferred to the donor site.  A full thickness graft was then excised from the donor site. The graft was then placed in the primary defect, oriented appropriately and then sutured into place.  The secondary defect was then repaired using a primary closure. Epidermal Autograft Text: The defect edges were debeveled with a #15 scalpel blade.  Given the location of the defect, shape of the defect and the proximity to free margins an epidermal autograft was deemed most appropriate.  Using a sterile surgical marker, the primary defect shape was transferred to the donor site. The epidermal graft was then harvested.  The skin graft was then placed in the primary defect and oriented appropriately. Dermal Autograft Text: The defect edges were debeveled with a #15 scalpel blade.  Given the location of the defect, shape of the defect and the proximity to free margins a dermal autograft was deemed most appropriate.  Using a sterile surgical marker, the primary defect shape was transferred to the donor site. The area thus outlined was incised deep to adipose tissue with a #15 scalpel blade.  The harvested graft was then trimmed of adipose and epidermal tissue until only dermis was left.  The skin graft was then placed in the primary defect and oriented appropriately. Skin Substitute Text: The defect edges were debeveled with a #15 scalpel blade.  Given the location of the defect, shape of the defect and the proximity to free margins a skin substitute graft was deemed most appropriate.  The graft material was trimmed to fit the size of the defect. The graft was then placed in the primary defect and oriented appropriately. Skin Substitute Paste Text: The defect edges were debeveled with a #15 scalpel blade.  Given the location of the defect, shape of the defect and the proximity to free margins a skin substitute micronized graft was deemed most appropriate.  The entire vial contents were admixed with 0.5ccs of sterile saline, formed into a paste and then evenly spread over the entire wound bed. Skin Substitute Injection Text: The defect edges were debeveled with a #15 scalpel blade.  Given the location of the defect, shape of the defect and the proximity to free margins a skin substitute micronized graft was deemed most appropriate.  The entire vial contents were admixed with 3.0ccs of sterile saline and then injected subcutaneously throughout the entire wound bed. Tissue Cultured Epidermal Autograft Text: The defect edges were debeveled with a #15 scalpel blade.  Given the location of the defect, shape of the defect and the proximity to free margins a tissue cultured epidermal autograft was deemed most appropriate.  The graft was then trimmed to fit the size of the defect.  The graft was then placed in the primary defect and oriented appropriately. Xenograft Text: The defect edges were debeveled with a #15 scalpel blade.  Given the location of the defect, shape of the defect and the proximity to free margins a xenograft was deemed most appropriate.  The graft was then trimmed to fit the size of the defect.  The graft was then placed in the primary defect and oriented appropriately. Purse String (Simple) Text: Given the location of the defect and the characteristics of the surrounding skin a pursestring closure was deemed most appropriate.  Undermining was performed circumfirentially around the surgical defect.  A purstring suture was then placed and tightened. Purse String (Intermediate) Text: Given the location of the defect and the characteristics of the surrounding skin a pursestring intermediate closure was deemed most appropriate.  Undermining was performed circumfirentially around the surgical defect.  A purstring suture was then placed and tightened. Partial Purse String (Simple) Text: Given the location of the defect and the characteristics of the surrounding skin a simple purse string closure was deemed most appropriate.  Undermining was performed circumfirentially around the surgical defect.  A purse string suture was then placed and tightened. Wound tension only allowed a partial closure of the circular defect. Partial Purse String (Intermediate) Text: Given the location of the defect and the characteristics of the surrounding skin an intermediate purse string closure was deemed most appropriate.  Undermining was performed circumfirentially around the surgical defect.  A purse string suture was then placed and tightened. Wound tension only allowed a partial closure of the circular defect. Localized Dermabrasion Text: The patient was draped in routine manner.  Localized dermabrasion using 3 x 17 mm wire brush was performed in routine manner to papillary dermis. This spot dermabrasion is being performed to complete skin cancer reconstruction. It also will eliminate the other sun damaged precancerous cells that are known to be part of the regional effect of a lifetime's worth of sun exposure. This localized dermabrasion is therapeutic and should not be considered cosmetic in any regard. Tarsorrhaphy Text: A tarsorrhaphy was performed using Frost sutures. Complex Repair And Flap Additional Text (Will Appearing After The Standard Complex Repair Text): The complex repair was not sufficient to completely close the primary defect. The remaining additional defect was repaired with the flap mentioned below. Complex Repair And Graft Additional Text (Will Appearing After The Standard Complex Repair Text): The complex repair was not sufficient to completely close the primary defect. The remaining additional defect was repaired with the graft mentioned below. Cheek Interpolation Flap Division And Inset Text: Division and inset of the cheek interpolation flap was performed to achieve optimal aesthetic result, restore normal anatomic appearance and avoid distortion of normal anatomy, expedite and facilitate wound healing, achieve optimal functional result and because linear closure either not possible or would produce suboptimal result. The patient was prepped and draped in the usual manner. The pedicle was infiltrated with local anesthesia. The pedicle was sectioned with a #15 blade. The pedicle was de-bulked and trimmed to match the shape of the defect. Hemostasis was achieved. The flap donor site and free margin of the flap were secured with deep buried sutures and the wound edges were re-approximated. Cheek To Nose Interpolation Flap Division And Inset Text: Division and inset of the cheek to nose interpolation flap was performed to achieve optimal aesthetic result, restore normal anatomic appearance and avoid distortion of normal anatomy, expedite and facilitate wound healing, achieve optimal functional result and because linear closure either not possible or would produce suboptimal result. The patient was prepped and draped in the usual manner. The pedicle was infiltrated with local anesthesia. The pedicle was sectioned with a #15 blade. The pedicle was de-bulked and trimmed to match the shape of the defect. Hemostasis was achieved. The flap donor site and free margin of the flap were secured with deep buried sutures and the wound edges were re-approximated. Melolabial Interpolation Flap Division And Inset Text: Division and inset of the melolabial interpolation flap was performed to achieve optimal aesthetic result, restore normal anatomic appearance and avoid distortion of normal anatomy, expedite and facilitate wound healing, achieve optimal functional result and because linear closure either not possible or would produce suboptimal result. The patient was prepped and draped in the usual manner. The pedicle was infiltrated with local anesthesia. The pedicle was sectioned with a #15 blade. The pedicle was de-bulked and trimmed to match the shape of the defect. Hemostasis was achieved. The flap donor site and free margin of the flap were secured with deep buried sutures and the wound edges were re-approximated. Mastoid Interpolation Flap Division And Inset Text: Division and inset of the mastoid interpolation flap was performed to achieve optimal aesthetic result, restore normal anatomic appearance and avoid distortion of normal anatomy, expedite and facilitate wound healing, achieve optimal functional result and because linear closure either not possible or would produce suboptimal result. The patient was prepped and draped in the usual manner. The pedicle was infiltrated with local anesthesia. The pedicle was sectioned with a #15 blade. The pedicle was de-bulked and trimmed to match the shape of the defect. Hemostasis was achieved. The flap donor site and free margin of the flap were secured with deep buried sutures and the wound edges were re-approximated. Paramedian Forehead Flap Division And Inset Text: Division and inset of the paramedian forehead flap was performed to achieve optimal aesthetic result, restore normal anatomic appearance and avoid distortion of normal anatomy, expedite and facilitate wound healing, achieve optimal functional result and because linear closure either not possible or would produce suboptimal result. The patient was prepped and draped in the usual manner. The pedicle was infiltrated with local anesthesia. The pedicle was sectioned with a #15 blade. The pedicle was de-bulked and trimmed to match the shape of the defect. Hemostasis was achieved. The flap donor site and free margin of the flap were secured with deep buried sutures and the wound edges were re-approximated. Posterior Auricular Interpolation Flap Division And Inset Text: Division and inset of the posterior auricular interpolation flap was performed to achieve optimal aesthetic result, restore normal anatomic appearance and avoid distortion of normal anatomy, expedite and facilitate wound healing, achieve optimal functional result and because linear closure either not possible or would produce suboptimal result. The patient was prepped and draped in the usual manner. The pedicle was infiltrated with local anesthesia. The pedicle was sectioned with a #15 blade. The pedicle was de-bulked and trimmed to match the shape of the defect. Hemostasis was achieved. The flap donor site and free margin of the flap were secured with deep buried sutures and the wound edges were re-approximated. Manual Repair Warning Statement: We plan on removing the manually selected variable below in favor of our much easier automatic structured text blocks found in the previous tab. We decided to do this to help make the flow better and give you the full power of structured data. Manual selection is never going to be ideal in our platform and I would encourage you to avoid using manual selection from this point on, especially since I will be sunsetting this feature. It is important that you do one of two things with the customized text below. First, you can save all of the text in a word file so you can have it for future reference. Second, transfer the text to the appropriate area in the Library tab. Lastly, if there is a flap or graft type which we do not have you need to let us know right away so I can add it in before the variable is hidden. No need to panic, we plan to give you roughly 6 months to make the change. Consent: The rationale for Repairs was explained to the patient and consent was obtained. The risks, benefits and alternatives to therapy were discussed in detail. Specifically, the risks of infection, scarring, bleeding, prolonged wound healing, incomplete removal, allergy to anesthesia, nerve injury and recurrence were addressed. Prior to the procedure, the treatment site was clearly identified and confirmed by the patient. All components of Universal Protocol/PAUSE Rule completed. Post-Care Instructions: I reviewed with the patient in detail post-care instructions. Patient is not to engage in any heavy lifting, exercise, or swimming for the next  7 days. Should the patient develop any fevers, chills, bleeding, severe pain patient will contact the office immediately. Pain Refusal Text: I offered to prescribe pain medication but the patient refused to take this medication. Where Do You Want The Question To Include Opioid Counseling Located?: Case Summary Tab Information: Selecting Yes will display possible errors in your note based on the variables you have selected. This validation is only offered as a suggestion for you. PLEASE NOTE THAT THE VALIDATION TEXT WILL BE REMOVED WHEN YOU FINALIZE YOUR NOTE. IF YOU WANT TO FAX A PRELIMINARY NOTE YOU WILL NEED TO TOGGLE THIS TO 'NO' IF YOU DO NOT WANT IT IN YOUR FAXED NOTE.

## 2021-11-13 NOTE — ED PROVIDER NOTE - CPE EDP HEAD NORM PED
Patient checked into walk in clinic for right knee pain. States it is very painful to stand and requesting wheelchair to get from car to clinic.  Called patient and advised him I am happy to evaluate him here however there are 4 patients ahead of him at [facility]
Head atraumatic, normal cephalic shape.

## 2022-05-05 NOTE — ED ADULT NURSE NOTE - NSFALLRSKUNASSIST_ED_ALL_ED
no

## 2022-06-08 NOTE — ED ADULT NURSE NOTE - ASSOCIATED SYMPTOMS
vomiting (clear mucus) Olumiant Counseling: I discussed with the patient the risks of Olumiant therapy including but not limited to upper respiratory tract infections, shingles, cold sores, and nausea. Live vaccines should be avoided.  This medication has been linked to serious infections; higher rate of mortality; malignancy and lymphoproliferative disorders; major adverse cardiovascular events; thrombosis; gastrointestinal perforations; neutropenia; lymphopenia; anemia; liver enzyme elevations; and lipid elevations.

## 2022-06-28 ENCOUNTER — INPATIENT (INPATIENT)
Facility: HOSPITAL | Age: 87
LOS: 8 days | Discharge: NURSING, REHAB OR LONG-TERM CARE | End: 2022-07-07
Attending: STUDENT IN AN ORGANIZED HEALTH CARE EDUCATION/TRAINING PROGRAM | Admitting: STUDENT IN AN ORGANIZED HEALTH CARE EDUCATION/TRAINING PROGRAM
Payer: MEDICARE

## 2022-06-28 VITALS
HEART RATE: 87 BPM | RESPIRATION RATE: 18 BRPM | SYSTOLIC BLOOD PRESSURE: 114 MMHG | DIASTOLIC BLOOD PRESSURE: 50 MMHG | HEIGHT: 63 IN | OXYGEN SATURATION: 100 %

## 2022-06-28 LAB
ALBUMIN SERPL ELPH-MCNC: 2.4 G/DL — LOW (ref 3.3–5)
ALP SERPL-CCNC: 119 U/L — SIGNIFICANT CHANGE UP (ref 40–120)
ALT FLD-CCNC: 39 U/L — HIGH (ref 4–33)
ANION GAP SERPL CALC-SCNC: 22 MMOL/L — HIGH (ref 7–14)
APPEARANCE UR: ABNORMAL
AST SERPL-CCNC: 51 U/L — HIGH (ref 4–32)
BACTERIA # UR AUTO: NEGATIVE — SIGNIFICANT CHANGE UP
BASE EXCESS BLDV CALC-SCNC: -6.9 MMOL/L — LOW (ref -2–3)
BASOPHILS # BLD AUTO: 0.01 K/UL — SIGNIFICANT CHANGE UP (ref 0–0.2)
BASOPHILS NFR BLD AUTO: 0.1 % — SIGNIFICANT CHANGE UP (ref 0–2)
BILIRUB SERPL-MCNC: 0.4 MG/DL — SIGNIFICANT CHANGE UP (ref 0.2–1.2)
BILIRUB UR-MCNC: NEGATIVE — SIGNIFICANT CHANGE UP
BLOOD GAS VENOUS COMPREHENSIVE RESULT: SIGNIFICANT CHANGE UP
BUN SERPL-MCNC: 55 MG/DL — HIGH (ref 7–23)
CALCIUM SERPL-MCNC: 10.3 MG/DL — SIGNIFICANT CHANGE UP (ref 8.4–10.5)
CHLORIDE BLDV-SCNC: 103 MMOL/L — SIGNIFICANT CHANGE UP (ref 96–108)
CHLORIDE SERPL-SCNC: 101 MMOL/L — SIGNIFICANT CHANGE UP (ref 98–107)
CK SERPL-CCNC: 166 U/L — SIGNIFICANT CHANGE UP (ref 25–170)
CO2 BLDV-SCNC: 20.4 MMOL/L — LOW (ref 22–26)
CO2 SERPL-SCNC: 17 MMOL/L — LOW (ref 22–31)
COLOR SPEC: YELLOW — SIGNIFICANT CHANGE UP
CREAT SERPL-MCNC: 0.83 MG/DL — SIGNIFICANT CHANGE UP (ref 0.5–1.3)
DIFF PNL FLD: NEGATIVE — SIGNIFICANT CHANGE UP
EGFR: 67 ML/MIN/1.73M2 — SIGNIFICANT CHANGE UP
EOSINOPHIL # BLD AUTO: 0 K/UL — SIGNIFICANT CHANGE UP (ref 0–0.5)
EOSINOPHIL NFR BLD AUTO: 0 % — SIGNIFICANT CHANGE UP (ref 0–6)
EPI CELLS # UR: 9 /HPF — HIGH (ref 0–5)
GAS PNL BLDV: 133 MMOL/L — LOW (ref 136–145)
GLUCOSE BLDV-MCNC: 106 MG/DL — HIGH (ref 70–99)
GLUCOSE SERPL-MCNC: 124 MG/DL — HIGH (ref 70–99)
GLUCOSE UR QL: NEGATIVE — SIGNIFICANT CHANGE UP
HCO3 BLDV-SCNC: 19 MMOL/L — LOW (ref 22–29)
HCT VFR BLD CALC: 32.7 % — LOW (ref 34.5–45)
HCT VFR BLDA CALC: 30 % — LOW (ref 34.5–46.5)
HGB BLD CALC-MCNC: 9.9 G/DL — LOW (ref 11.5–15.5)
HGB BLD-MCNC: 10 G/DL — LOW (ref 11.5–15.5)
HYALINE CASTS # UR AUTO: 42 /LPF — HIGH (ref 0–7)
IANC: 11.4 K/UL — HIGH (ref 1.8–7.4)
IMM GRANULOCYTES NFR BLD AUTO: 0.6 % — SIGNIFICANT CHANGE UP (ref 0–1.5)
KETONES UR-MCNC: ABNORMAL
LACTATE BLDV-MCNC: 2.1 MMOL/L — HIGH (ref 0.5–2)
LEUKOCYTE ESTERASE UR-ACNC: ABNORMAL
LYMPHOCYTES # BLD AUTO: 0.28 K/UL — LOW (ref 1–3.3)
LYMPHOCYTES # BLD AUTO: 2.3 % — LOW (ref 13–44)
MCHC RBC-ENTMCNC: 24.3 PG — LOW (ref 27–34)
MCHC RBC-ENTMCNC: 30.6 GM/DL — LOW (ref 32–36)
MCV RBC AUTO: 79.6 FL — LOW (ref 80–100)
MONOCYTES # BLD AUTO: 0.64 K/UL — SIGNIFICANT CHANGE UP (ref 0–0.9)
MONOCYTES NFR BLD AUTO: 5.2 % — SIGNIFICANT CHANGE UP (ref 2–14)
NEUTROPHILS # BLD AUTO: 11.4 K/UL — HIGH (ref 1.8–7.4)
NEUTROPHILS NFR BLD AUTO: 91.8 % — HIGH (ref 43–77)
NITRITE UR-MCNC: NEGATIVE — SIGNIFICANT CHANGE UP
NRBC # BLD: 0 /100 WBCS — SIGNIFICANT CHANGE UP
NRBC # FLD: 0 K/UL — SIGNIFICANT CHANGE UP
PCO2 BLDV: 40 MMHG — SIGNIFICANT CHANGE UP (ref 39–42)
PH BLDV: 7.29 — LOW (ref 7.32–7.43)
PH UR: 5.5 — SIGNIFICANT CHANGE UP (ref 5–8)
PLATELET # BLD AUTO: 453 K/UL — HIGH (ref 150–400)
PO2 BLDV: 77 MMHG — SIGNIFICANT CHANGE UP
POTASSIUM BLDV-SCNC: 3.8 MMOL/L — SIGNIFICANT CHANGE UP (ref 3.5–5.1)
POTASSIUM SERPL-MCNC: 4.2 MMOL/L — SIGNIFICANT CHANGE UP (ref 3.5–5.3)
POTASSIUM SERPL-SCNC: 4.2 MMOL/L — SIGNIFICANT CHANGE UP (ref 3.5–5.3)
PROT SERPL-MCNC: 4.6 G/DL — LOW (ref 6–8.3)
PROT UR-MCNC: ABNORMAL
RBC # BLD: 4.11 M/UL — SIGNIFICANT CHANGE UP (ref 3.8–5.2)
RBC # FLD: 19.2 % — HIGH (ref 10.3–14.5)
RBC CASTS # UR COMP ASSIST: 14 /HPF — HIGH (ref 0–4)
SAO2 % BLDV: 94.2 % — SIGNIFICANT CHANGE UP
SARS-COV-2 RNA SPEC QL NAA+PROBE: SIGNIFICANT CHANGE UP
SODIUM SERPL-SCNC: 140 MMOL/L — SIGNIFICANT CHANGE UP (ref 135–145)
SP GR SPEC: 1.02 — SIGNIFICANT CHANGE UP (ref 1–1.05)
TROPONIN T, HIGH SENSITIVITY RESULT: 42 NG/L — SIGNIFICANT CHANGE UP
TROPONIN T, HIGH SENSITIVITY RESULT: 45 NG/L — SIGNIFICANT CHANGE UP
UROBILINOGEN FLD QL: ABNORMAL
WBC # BLD: 12.41 K/UL — HIGH (ref 3.8–10.5)
WBC # FLD AUTO: 12.41 K/UL — HIGH (ref 3.8–10.5)
WBC UR QL: 20 /HPF — HIGH (ref 0–5)

## 2022-06-28 PROCEDURE — 70450 CT HEAD/BRAIN W/O DYE: CPT | Mod: 26,MA

## 2022-06-28 PROCEDURE — 71250 CT THORAX DX C-: CPT | Mod: 26,MA

## 2022-06-28 PROCEDURE — 72128 CT CHEST SPINE W/O DYE: CPT | Mod: 26,MA

## 2022-06-28 PROCEDURE — 73502 X-RAY EXAM HIP UNI 2-3 VIEWS: CPT | Mod: 26,LT

## 2022-06-28 PROCEDURE — 73080 X-RAY EXAM OF ELBOW: CPT | Mod: 26,50

## 2022-06-28 PROCEDURE — 73030 X-RAY EXAM OF SHOULDER: CPT | Mod: 26,LT

## 2022-06-28 PROCEDURE — 73564 X-RAY EXAM KNEE 4 OR MORE: CPT | Mod: 26,LT

## 2022-06-28 PROCEDURE — 72131 CT LUMBAR SPINE W/O DYE: CPT | Mod: 26,MA

## 2022-06-28 PROCEDURE — 99285 EMERGENCY DEPT VISIT HI MDM: CPT | Mod: 25,GC

## 2022-06-28 PROCEDURE — 72125 CT NECK SPINE W/O DYE: CPT | Mod: 26,MA

## 2022-06-28 PROCEDURE — 93010 ELECTROCARDIOGRAM REPORT: CPT

## 2022-06-28 RX ORDER — CEFTRIAXONE 500 MG/1
1000 INJECTION, POWDER, FOR SOLUTION INTRAMUSCULAR; INTRAVENOUS ONCE
Refills: 0 | Status: COMPLETED | OUTPATIENT
Start: 2022-06-28 | End: 2022-06-28

## 2022-06-28 RX ORDER — SODIUM CHLORIDE 9 MG/ML
1000 INJECTION INTRAMUSCULAR; INTRAVENOUS; SUBCUTANEOUS ONCE
Refills: 0 | Status: COMPLETED | OUTPATIENT
Start: 2022-06-28 | End: 2022-06-28

## 2022-06-28 RX ORDER — ACETAMINOPHEN 500 MG
650 TABLET ORAL ONCE
Refills: 0 | Status: COMPLETED | OUTPATIENT
Start: 2022-06-28 | End: 2022-06-28

## 2022-06-28 RX ADMIN — SODIUM CHLORIDE 1000 MILLILITER(S): 9 INJECTION INTRAMUSCULAR; INTRAVENOUS; SUBCUTANEOUS at 19:31

## 2022-06-28 RX ADMIN — Medication 650 MILLIGRAM(S): at 18:11

## 2022-06-28 RX ADMIN — CEFTRIAXONE 100 MILLIGRAM(S): 500 INJECTION, POWDER, FOR SOLUTION INTRAMUSCULAR; INTRAVENOUS at 19:30

## 2022-06-28 NOTE — ED PROVIDER NOTE - ATTENDING CONTRIBUTION TO CARE
Patient is an 89 yo F with history of DM, HTN, hyperlipidemia here for evaluation after a fall. Patient was found on the floor. Per grand-daughter at bedside, patient was found on the floor in her home -     Last known well was    Patient lives alone. Per grand-daughter, patient is confused and speaking about her  . No recent hospitalizations.     Patient reports urinary frequency but denies chest pain, shortness of breath, abdominal pain, nausea, vomiting, fevers, chills. She states she feels weak. Patient states she was walking and fell and cannot provide details.     VS noted  Gen. no acute distress, Non toxic   HEENT: EOMI, mmm, left scalp  Spine:  Lungs: CTAB/L no C/ W /R   CVS: RRR   Abd; Soft non tender, non distended   Pelvis:  Ext: no edema  Skin: no rash  Neuro AAOx3 non focal clear speech  a/p:   - Yasmine ROSA Patient is an 89 yo F with history of DM, HTN, hyperlipidemia here for evaluation after a fall. Patient was found on the floor. Per grand-daughter at bedside, patient was found on the bathroom floor in her home. Last known well was 2 days ago. Grand-daughter states that her uncle spoke to her 2 days ago. It is unclear when the fall was. Patient is unable to say when she fell. Patient lives alone. Per grand-daughter, patient is confused and speaking about her  . No recent hospitalizations.     Patient reports urinary frequency but denies chest pain, shortness of breath, abdominal pain, nausea, vomiting, fevers, chills. She states she feels weak. Patient states she was walking and fell and cannot provide details.     VS noted  Gen. no acute distress, Non toxic   HEENT: EOMI, mmm, left scalp tenderness  Spine: + mild C-spine tenderness, + T-spine tenderness, no L-spine tenderness  Lungs: CTAB/L no C/ W /R   CVS: RRR   Abd; Soft non tender, non distended   Pelvis: stable, ttp to left hip  Ext: no edema  Skin: no rash  Neuro AAOx3 non focal clear speech  a/p: s/p fall - patient down for unknown amount of time. Per grand-daughter, patient lives alone. Patient appears weak. She will need ekg, labs, u/a, CT Head, CT C-spine, CT Chest, recon with CT Thoracic spine, pelvis and left hip XR.   - Yasmine ROSA Patient is an 88 yo F with history of DM, HTN, hyperlipidemia here for evaluation after a fall. Patient was found on the floor. Per grand-daughter at bedside, patient was found on the bathroom floor in her home. Last known well was 2 days ago. Grand-daughter states that her uncle spoke to her 2 days ago. It is unclear when the fall was. Patient is unable to say when she fell. Patient lives alone. Per grand-daughter, patient is confused and speaking about her  . No recent hospitalizations.     Patient reports urinary frequency but denies chest pain, shortness of breath, abdominal pain, nausea, vomiting, fevers, chills. She states she feels weak. Patient states she was walking and fell and cannot provide details.     VS noted  Gen. no acute distress, Non toxic   HEENT: EOMI, mmm, left scalp tenderness  Spine: + mild C-spine tenderness, + T-spine tenderness, no L-spine tenderness  Lungs: CTAB/L no C/ W /R   CVS: RRR   Abd; Soft non tender, non distended   Pelvis: stable, ttp to left hip  Ext: no edema  Skin: no rash  Neuro AAOx3 non focal clear speech  a/p: s/p fall - patient down for unknown amount of time. Per grand-daughter, patient lives alone. Patient appears weak. She will need ekg, labs, u/a, CT Head, CT C-spine, CT Chest, recon with CT Thoracic spine, pelvis and left hip XR. Will need admission as she is unsafe to d/c home.   - Yasmine ROSA

## 2022-06-28 NOTE — ED PROVIDER NOTE - OBJECTIVE STATEMENT
89 yo F pmhx htn dm hld anemia p/w fall. Pt poor historian hx provided by grand daughter at bedside. Pt lives alone. Found down today in bathroom after fall ems called. unknown amount of time down. last known normal 2 days ago. 1 week of ams speaking of  . Pt reports multiple falls. hit head unknown loc. pt reporting pain to neck back shoulder elbows and knees. pt reports urinary frequency with no dysuria or hematuria. no f/c cp sob abd pain n/v recent illness sick contacts.

## 2022-06-28 NOTE — ED ADULT NURSE NOTE - OBJECTIVE STATEMENT
Received patient to room 10 for fall. A&o1, ambulatory at baseline, brought in by family for suspected fall, per granddaughter, was unable to get in house, EMS broke open window and found patient on floor, last known well approx. 2 days ago, pt monica historian, unknown events. Pt c/o left leg pain, noted to have slight bruising, presents in c-collar via EMS, also endorsing head pain.  Able to follow commands, NSR on monitor, RR even and unlabored, denies chest pain, dizziness, sob at this time. hx of HTN, DM, right 22G hand IV placed, labs sent, medicated per MD orders, pending ct scan,

## 2022-06-28 NOTE — ED PROVIDER NOTE - PHYSICAL EXAMINATION
Gen: NAD, non-toxic appearing  Head: TTP L parietal scalp  HEENT: normal conjunctiva, oral mucosa moist  Lung: no respiratory distress, speaking in full sentences, CTA b/l     CV: regular rate and rhythm, no murmurs  Abd: soft, non distended, non tender   MSK: TTP c spine t/l spine. ttp L shoulder limited rom 2/2 pain. L knee ttp. b/l elbows old scabs   Neuro: No focal deficits, AAOx3  Skin: Warm  Psych: normal affect

## 2022-06-28 NOTE — ED PROVIDER NOTE - CLINICAL SUMMARY MEDICAL DECISION MAKING FREE TEXT BOX
89 yo F pmhx htn dm hld anemia p/w fall. Pt poor historian hx provided by grand daughter at bedside. Pt lives alone. Found down today in bathroom after fall ems called. unknown amount of time down. last known normal 2 days ago. 1 week of ams speaking of  . Pt reports multiple falls. hit head unknown loc. pt reporting pain to neck back shoulder elbows and knees. pt reports urinary frequency with no dysuria or hematuria. VSS.  Parietal scalp ttp. TTP c spine t/l spine. ttp L shoulder limited rom 2/2 pain. L knee ttp. b/l elbows old scabs. c/f ich fx vs dislocation vs contusion. c/f ams 2/2 uti infection cardiac e- metabolic. will get labs ua cxr ekg trop cxr ct head ct spine xrays analgesia TBA.

## 2022-06-28 NOTE — ED ADULT TRIAGE NOTE - CHIEF COMPLAINT QUOTE
pt coming from home.  pt fell at 10am, granddaughter called 911.  pt arrives in c-collar, c/o pain to head, back and left shoulder.  unable to obtain temp

## 2022-06-29 DIAGNOSIS — R79.89 OTHER SPECIFIED ABNORMAL FINDINGS OF BLOOD CHEMISTRY: ICD-10-CM

## 2022-06-29 DIAGNOSIS — D50.9 IRON DEFICIENCY ANEMIA, UNSPECIFIED: ICD-10-CM

## 2022-06-29 DIAGNOSIS — R62.7 ADULT FAILURE TO THRIVE: ICD-10-CM

## 2022-06-29 DIAGNOSIS — Z29.9 ENCOUNTER FOR PROPHYLACTIC MEASURES, UNSPECIFIED: ICD-10-CM

## 2022-06-29 DIAGNOSIS — R91.8 OTHER NONSPECIFIC ABNORMAL FINDING OF LUNG FIELD: ICD-10-CM

## 2022-06-29 DIAGNOSIS — N63.0 UNSPECIFIED LUMP IN UNSPECIFIED BREAST: ICD-10-CM

## 2022-06-29 DIAGNOSIS — R53.1 WEAKNESS: ICD-10-CM

## 2022-06-29 DIAGNOSIS — N39.0 URINARY TRACT INFECTION, SITE NOT SPECIFIED: ICD-10-CM

## 2022-06-29 DIAGNOSIS — E86.0 DEHYDRATION: ICD-10-CM

## 2022-06-29 DIAGNOSIS — S32.009A UNSPECIFIED FRACTURE OF UNSPECIFIED LUMBAR VERTEBRA, INITIAL ENCOUNTER FOR CLOSED FRACTURE: ICD-10-CM

## 2022-06-29 DIAGNOSIS — R41.82 ALTERED MENTAL STATUS, UNSPECIFIED: ICD-10-CM

## 2022-06-29 LAB
ALBUMIN SERPL ELPH-MCNC: 2.3 G/DL — LOW (ref 3.3–5)
ALP SERPL-CCNC: 110 U/L — SIGNIFICANT CHANGE UP (ref 40–120)
ALT FLD-CCNC: 34 U/L — HIGH (ref 4–33)
ANION GAP SERPL CALC-SCNC: 14 MMOL/L — SIGNIFICANT CHANGE UP (ref 7–14)
AST SERPL-CCNC: 47 U/L — HIGH (ref 4–32)
BASOPHILS # BLD AUTO: 0.01 K/UL — SIGNIFICANT CHANGE UP (ref 0–0.2)
BASOPHILS NFR BLD AUTO: 0.1 % — SIGNIFICANT CHANGE UP (ref 0–2)
BILIRUB SERPL-MCNC: 0.3 MG/DL — SIGNIFICANT CHANGE UP (ref 0.2–1.2)
BUN SERPL-MCNC: 50 MG/DL — HIGH (ref 7–23)
CALCIUM SERPL-MCNC: 9.7 MG/DL — SIGNIFICANT CHANGE UP (ref 8.4–10.5)
CHLORIDE SERPL-SCNC: 105 MMOL/L — SIGNIFICANT CHANGE UP (ref 98–107)
CK MB BLD-MCNC: 6.2 % — HIGH (ref 0–2.5)
CK MB CFR SERPL CALC: 3.5 NG/ML — SIGNIFICANT CHANGE UP
CK SERPL-CCNC: 56 U/L — SIGNIFICANT CHANGE UP (ref 25–170)
CO2 SERPL-SCNC: 22 MMOL/L — SIGNIFICANT CHANGE UP (ref 22–31)
CREAT SERPL-MCNC: 0.8 MG/DL — SIGNIFICANT CHANGE UP (ref 0.5–1.3)
EGFR: 70 ML/MIN/1.73M2 — SIGNIFICANT CHANGE UP
EOSINOPHIL # BLD AUTO: 0 K/UL — SIGNIFICANT CHANGE UP (ref 0–0.5)
EOSINOPHIL NFR BLD AUTO: 0 % — SIGNIFICANT CHANGE UP (ref 0–6)
GLUCOSE BLDC GLUCOMTR-MCNC: 128 MG/DL — HIGH (ref 70–99)
GLUCOSE SERPL-MCNC: 124 MG/DL — HIGH (ref 70–99)
HCT VFR BLD CALC: 32.4 % — LOW (ref 34.5–45)
HGB BLD-MCNC: 9.2 G/DL — LOW (ref 11.5–15.5)
IANC: 10.01 K/UL — HIGH (ref 1.8–7.4)
IMM GRANULOCYTES NFR BLD AUTO: 0.4 % — SIGNIFICANT CHANGE UP (ref 0–1.5)
LYMPHOCYTES # BLD AUTO: 0.6 K/UL — LOW (ref 1–3.3)
LYMPHOCYTES # BLD AUTO: 5.3 % — LOW (ref 13–44)
MAGNESIUM SERPL-MCNC: 1.8 MG/DL — SIGNIFICANT CHANGE UP (ref 1.6–2.6)
MCHC RBC-ENTMCNC: 23.7 PG — LOW (ref 27–34)
MCHC RBC-ENTMCNC: 28.4 GM/DL — LOW (ref 32–36)
MCV RBC AUTO: 83.3 FL — SIGNIFICANT CHANGE UP (ref 80–100)
MONOCYTES # BLD AUTO: 0.71 K/UL — SIGNIFICANT CHANGE UP (ref 0–0.9)
MONOCYTES NFR BLD AUTO: 6.2 % — SIGNIFICANT CHANGE UP (ref 2–14)
NEUTROPHILS # BLD AUTO: 10.01 K/UL — HIGH (ref 1.8–7.4)
NEUTROPHILS NFR BLD AUTO: 88 % — HIGH (ref 43–77)
NRBC # BLD: 0 /100 WBCS — SIGNIFICANT CHANGE UP
NRBC # FLD: 0 K/UL — SIGNIFICANT CHANGE UP
PHOSPHATE SERPL-MCNC: 2.5 MG/DL — SIGNIFICANT CHANGE UP (ref 2.5–4.5)
PLATELET # BLD AUTO: 373 K/UL — SIGNIFICANT CHANGE UP (ref 150–400)
POTASSIUM SERPL-MCNC: 4 MMOL/L — SIGNIFICANT CHANGE UP (ref 3.5–5.3)
POTASSIUM SERPL-SCNC: 4 MMOL/L — SIGNIFICANT CHANGE UP (ref 3.5–5.3)
PROT SERPL-MCNC: 4.4 G/DL — LOW (ref 6–8.3)
RBC # BLD: 3.89 M/UL — SIGNIFICANT CHANGE UP (ref 3.8–5.2)
RBC # FLD: 19.3 % — HIGH (ref 10.3–14.5)
SODIUM SERPL-SCNC: 141 MMOL/L — SIGNIFICANT CHANGE UP (ref 135–145)
T3 SERPL-MCNC: 42 NG/DL — LOW (ref 80–200)
T4 AB SER-ACNC: 7.05 UG/DL — SIGNIFICANT CHANGE UP (ref 5.1–13)
TROPONIN T, HIGH SENSITIVITY RESULT: 52 NG/L — HIGH
TSH SERPL-MCNC: <0.1 UIU/ML — LOW (ref 0.27–4.2)
TSH SERPL-MCNC: <0.1 UIU/ML — LOW (ref 0.27–4.2)
WBC # BLD: 11.38 K/UL — HIGH (ref 3.8–10.5)
WBC # FLD AUTO: 11.38 K/UL — HIGH (ref 3.8–10.5)

## 2022-06-29 PROCEDURE — 99223 1ST HOSP IP/OBS HIGH 75: CPT

## 2022-06-29 PROCEDURE — 12345: CPT | Mod: NC

## 2022-06-29 RX ORDER — SIMVASTATIN 20 MG/1
0 TABLET, FILM COATED ORAL
Qty: 0 | Refills: 0 | DISCHARGE

## 2022-06-29 RX ORDER — HEPARIN SODIUM 5000 [USP'U]/ML
5000 INJECTION INTRAVENOUS; SUBCUTANEOUS EVERY 12 HOURS
Refills: 0 | Status: DISCONTINUED | OUTPATIENT
Start: 2022-06-29 | End: 2022-06-30

## 2022-06-29 RX ORDER — LIDOCAINE 4 G/100G
1 CREAM TOPICAL DAILY
Refills: 0 | Status: DISCONTINUED | OUTPATIENT
Start: 2022-06-29 | End: 2022-07-07

## 2022-06-29 RX ORDER — LANOLIN ALCOHOL/MO/W.PET/CERES
3 CREAM (GRAM) TOPICAL AT BEDTIME
Refills: 0 | Status: DISCONTINUED | OUTPATIENT
Start: 2022-06-29 | End: 2022-07-07

## 2022-06-29 RX ORDER — CEFTRIAXONE 500 MG/1
1000 INJECTION, POWDER, FOR SOLUTION INTRAMUSCULAR; INTRAVENOUS EVERY 24 HOURS
Refills: 0 | Status: COMPLETED | OUTPATIENT
Start: 2022-06-29 | End: 2022-07-01

## 2022-06-29 RX ORDER — PANTOPRAZOLE SODIUM 20 MG/1
1 TABLET, DELAYED RELEASE ORAL
Qty: 0 | Refills: 0 | DISCHARGE

## 2022-06-29 RX ORDER — FLUTICASONE PROPIONATE 50 MCG
0 SPRAY, SUSPENSION NASAL
Qty: 0 | Refills: 0 | DISCHARGE

## 2022-06-29 RX ORDER — SODIUM CHLORIDE 9 MG/ML
1000 INJECTION INTRAMUSCULAR; INTRAVENOUS; SUBCUTANEOUS
Refills: 0 | Status: DISCONTINUED | OUTPATIENT
Start: 2022-06-29 | End: 2022-07-02

## 2022-06-29 RX ORDER — ACETAMINOPHEN 500 MG
650 TABLET ORAL EVERY 8 HOURS
Refills: 0 | Status: DISCONTINUED | OUTPATIENT
Start: 2022-06-29 | End: 2022-07-07

## 2022-06-29 RX ADMIN — HEPARIN SODIUM 5000 UNIT(S): 5000 INJECTION INTRAVENOUS; SUBCUTANEOUS at 20:04

## 2022-06-29 RX ADMIN — CEFTRIAXONE 100 MILLIGRAM(S): 500 INJECTION, POWDER, FOR SOLUTION INTRAMUSCULAR; INTRAVENOUS at 20:03

## 2022-06-29 RX ADMIN — LIDOCAINE 1 PATCH: 4 CREAM TOPICAL at 20:00

## 2022-06-29 RX ADMIN — LIDOCAINE 1 PATCH: 4 CREAM TOPICAL at 12:14

## 2022-06-29 RX ADMIN — SODIUM CHLORIDE 75 MILLILITER(S): 9 INJECTION INTRAMUSCULAR; INTRAVENOUS; SUBCUTANEOUS at 07:10

## 2022-06-29 NOTE — H&P ADULT - ATTENDING COMMENTS
89 y/o female with MHx HTN, DM Type 2, HLD who presents after a fall a/w likely acute UTI failure to thrive c/b severe dehydration, hypoalbuminemia and cachexia; Found to have Rt breast mass with associated likely pulmonary metastases and likely malignant pleural effusion;       Assessment and plan supplemented and modified where indicated;

## 2022-06-29 NOTE — H&P ADULT - MUSCULOSKELETAL
no joint swelling/no calf tenderness/strength 5/5 bilateral upper extremities/strength 5/5 bilateral lower extremities details…

## 2022-06-29 NOTE — H&P ADULT - PROBLEM SELECTOR PLAN 5
Noted on CT chest. Granddaughter unaware if patient has history of cancer.   - suspect breast cancer, now with possible metastases to lung given multiple pulmonary nodules  - plan to consult hematology/oncology for help discussing options with family CT Cervical, Thoracic, and Lumbar Spine: Age-indeterminate fracture of the right L1 transverse process.  - ordered lidocaine patch  - tylenol PRN

## 2022-06-29 NOTE — H&P ADULT - PROBLEM SELECTOR PLAN 4
Extremities weak on physical exam, likely exacerbated by being on ground for three days.   - PT consult New diagnosis; CT chest significant for innumerable bilateral pulmonary nodules concerning for metastatic disease. A right breast soft tissue lesion measures up to 3.1 cm, concerning for primary malignancy. Moderate left pleural effusion. Retroperitoneal lymphadenopathy.  - suspect breast cancer, now with possible metastases to lung given multiple pulmonary nodules  - GOC to be initiated with the primary care given or HCP regarding workup/ biopsy   - Plan for potential breast mass biopsy if family agreeable  - Holding consulting hematology/oncology pending GOC discussion

## 2022-06-29 NOTE — OCCUPATIONAL THERAPY INITIAL EVALUATION ADULT - GENERAL OBSERVATIONS, REHAB EVAL
Patient received semisupine in bed in NAD and agreeable to participate in OT evaluation. +tele. +IV.

## 2022-06-29 NOTE — H&P ADULT - HISTORY OF PRESENT ILLNESS
Jae Mckenna She had a fall, likely on Sunday. Family visited three days later after no response from the patient. When they gained access to her home, they found her on the bathroom floor.     She lives alone. ADLs and IADLs intact.    Jae Mckenna is an She had a fall, likely on Sunday. Family visited three days later after no response from the patient. When they gained access to her home, they found her on the bathroom floor.     She lives alone. ADLs and IADLs intact.    Jae Mckenna is an 88 y/o female with PMHx HTN, DM, HLD who presents to the ED after a fall. The patient is AOx1 (name) and is poor historian. Called granddaughter (listed in handoff). She reports the patient had fallen, likely on Sunday. Family was unable to reach her so they called the fire department to break into the patient's home. The patient was found on the ground in her bathroom, moaning. The patient lives alone and has had all ADLs and IADLs intact, per granddaughter. Most of her care has been in [place] with PCP Dr. Cervantes. Granddaughter does not know patient's medications. The patient does not report pain currently. She would like some tea to drink.     In ED, vitals afebrile, HR 80s, -140s, saturating 97% on room air. Labs significant for pH 7.29, WBC 12.4, bicarb 17, BUN 55, alb 2.4, and UA+ for LE and WBCs. Imaging significant for right knee xray with chondrocalcinosis, left hip xray with spondylosis, right elbow xray with minimally displaced fracture, CT chest with multiple pulmonary nodules, and CT spine with L1 transverse process fracture. Received 1L NS, ceftriaxone, and tylenol in ED.    89 y/o female with MHx HTN, DM Type 2, HLD who presents to the ED after a fall. The patient is poor historian. Called granddaughter (listed in handoff). She reports the patient had fallen, likely 2 or 3 days ago. Family was unable to reach her so they called the fire department to break into the patient's home. The patient was found on the ground in her bathroom, moaning. The patient lives alone and has had all ADLs and IADLs intact, per granddaughter. Most of her care has been in [place] with PCP Dr. Cervantes. Granddaughter does not know patient's medications. The patient reports mild lower back pain, wore with movement, with no associated incontinence. She says that does not recall the event but reports no Abd pain, n/v/d, fever, cough, SOB, CP or palpitations. Also reports no HA and sore throat. She would like some tea to drink.     ED course: afebrile, HR 80s, -140s, saturating 97% on room air. Received 1L NS, ceftriaxone, and Tylenol.

## 2022-06-29 NOTE — CONSULT NOTE ADULT - TIME BILLING
Pt seen and examined. CT spine reviewed, demonstrates age-indeterminate R L1 transverse process fx. No Neurosurgical intervention anticipated. Reconsult PRN.

## 2022-06-29 NOTE — H&P ADULT - PROBLEM SELECTOR PLAN 6
Hemoglobin 10 on admission.   - granddaughter thinks patient may have had blood or iron infusions in the past, unknown  - gaining collateral from patient's PCP may be helpful i/s/o likely malignancy with mets and FTT; Likely exacerbated by dehydration; No evidence of rhabdo, CPK wnl  CT chest significant for trace mucoid secretions in the trachea - c/f potential dysphagia/ aspiration   - PT consult  - check TSH, vit D added on   - Formal speech and swallow ordered

## 2022-06-29 NOTE — H&P ADULT - NSHPREVIEWOFSYSTEMS_GEN_ALL_CORE
REVIEW OF SYSTEMS:    CONSTITUTIONAL: No weakness, fevers or chills  EYES/ENT: No visual changes;  No vertigo or throat pain   NECK: No pain or stiffness  RESPIRATORY: No cough, wheezing, hemoptysis; No shortness of breath  CARDIOVASCULAR: No chest pain or palpitations  GASTROINTESTINAL: No abdominal or epigastric pain. No nausea, vomiting, or hematemesis; No diarrhea or constipation. No melena or hematochezia.  GENITOURINARY: No dysuria, frequency or hematuria  NEUROLOGICAL: No numbness or weakness  SKIN: No itching, rashes REVIEW OF SYSTEMS:    CONSTITUTIONAL: + gen weakness, no fevers or chills  EYES/ENT: No visual changes;  No vertigo or throat pain   NECK: No pain or stiffness  RESPIRATORY: No cough, wheezing, hemoptysis; No shortness of breath  CARDIOVASCULAR: No chest pain or palpitations  GASTROINTESTINAL: No abdominal or epigastric pain. No nausea, vomiting, or hematemesis; No diarrhea or constipation. No melena or hematochezia.  GENITOURINARY: No dysuria, frequency or hematuria  NEUROLOGICAL: No numbness or weakness  SKIN: No itching, rashes    additional elements of ROS below

## 2022-06-29 NOTE — H&P ADULT - PROBLEM SELECTOR PLAN 3
Cachectic on exam.  - hypoalbuminemia down to 2.4  - nutrition consult Cachectic on exam. Hypoalbuminemia down to 2.4, severe dehydration, gen weakness, likely metastatic malignancy;   - Nutrition consult requested  - Hold simvastatin

## 2022-06-29 NOTE — SWALLOW BEDSIDE ASSESSMENT ADULT - ADDITIONAL RECOMMENDATIONS
1. Monitor for PO intake/tolerance   2. This service to follow up as schedule permits, reconsult this service if status change.

## 2022-06-29 NOTE — H&P ADULT - PROBLEM SELECTOR PLAN 8
DVT ppx: lovenox   Diet: NPO for now  Code status: full code  Dispo: admit to medicine DVT ppx: Heparin sq bid  Diet: NPO for now  Code status: full code  Dispo: admit to medicine

## 2022-06-29 NOTE — H&P ADULT - RESPIRATORY
clear to auscultation bilaterally/no wheezes/no rales/no rhonchi/breath sounds equal/respirations non-labored

## 2022-06-29 NOTE — PATIENT PROFILE ADULT - FALL HARM RISK - UNIVERSAL INTERVENTIONS
Bed in lowest position, wheels locked, appropriate side rails in place/Call bell, personal items and telephone in reach/Instruct patient to call for assistance before getting out of bed or chair/Non-slip footwear when patient is out of bed/[place] to call system/Physically safe environment - no spills, clutter or unnecessary equipment/Purposeful Proactive Rounding/Room/bathroom lighting operational, light cord in reach

## 2022-06-29 NOTE — PROGRESS NOTE ADULT - PROBLEM SELECTOR PLAN 4
New diagnosis; CT chest significant for innumerable bilateral pulmonary nodules concerning for metastatic disease. A right breast soft tissue lesion measures up to 3.1 cm, concerning for primary malignancy. Moderate left pleural effusion. Retroperitoneal lymphadenopathy. Suspect breast cancer, now with possible metastases to lung given multiple pulmonary nodules. Despite explaining findings to the patient she says that this is not why she is in the hospital and does not believe that these conditions could have developed overnight.   - Discussed that we need to speak with HCP with the granddaughter today (6/29), she is not the HCP.   - Attempted to call HCP (her son) - no answer   - Plan for potential breast mass biopsy if family agreeable  - Holding consulting hematology/oncology pending C discussion New diagnosis; CT chest significant for innumerable bilateral pulmonary nodules concerning for metastatic disease. A right breast soft tissue lesion measures up to 3.1 cm, concerning for primary malignancy. Moderate left pleural effusion. Retroperitoneal lymphadenopathy. Suspect breast cancer, now with possible metastases to lung given multiple pulmonary nodules. Despite explaining findings to the patient she says that this is not why she is in the hospital and does not believe that these conditions could have developed overnight.   - Son is HCP - will consider biopsy depending on how invasive it is. Will consult IR to determine if a biopsy can be performed and where it would be.   - Plan for potential breast mass biopsy if family agreeable  - Holding consulting hematology/oncology pending GOC discussion

## 2022-06-29 NOTE — PHYSICAL THERAPY INITIAL EVALUATION ADULT - PERTINENT HX OF CURRENT PROBLEM, REHAB EVAL
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

## 2022-06-29 NOTE — OCCUPATIONAL THERAPY INITIAL EVALUATION ADULT - PERTINENT HX OF CURRENT PROBLEM, REHAB EVAL
89 year old female with history of HTN, DM Type 2, HLD who presents after s/p fall. Found to have acute UTI, failure to thrive c/b severe dehydration, hypoalbuminemia and cachexia. Also found to have right breast mass with associated likely pulmonary metastases and pleural effusion as well as age-indeterminate fracture of the right L1 transverse process.

## 2022-06-29 NOTE — CONSULT NOTE ADULT - ASSESSMENT
89 yr old female w/ age indeterminate R L1 transverse process fracture  - No acute neurosurgical intervention  - No brace required  - D/w Dr Duval

## 2022-06-29 NOTE — PROGRESS NOTE ADULT - PROBLEM SELECTOR PLAN 9
DVT ppx: Heparin sq bid  Diet: Pureed diet with thin liquids - as per S&S evaluation   Code status: full code  Dispo: admit to medicine DVT ppx: Heparin sq bid  Diet: Pureed diet with thin liquids - as per S&S evaluation   Code status: full code

## 2022-06-29 NOTE — SWALLOW BEDSIDE ASSESSMENT ADULT - SWALLOW EVAL: DIAGNOSIS
1. Moderate oral dysphagia for minced and moist and regular solids marked by reduced oral aperture, sow bolus manipulation and reduced A-P transport. There was moderate lingual residue in the oral cavity after the primary swallow clearing with multiple swallows. 2. Functional oral phase for thin liquids, mildly and moderately thick liquids and pureed marked by adequate oral aperture and bolus collection with functional A-P transport. 3. Functional pharyngeal phase for thin, mildly and moderately thick liquids, pureed and minced and moist marked by initiation of the pharyngeal swallow with hyolaryngeal elevation and excursion upon digital palpation. There were no overt signs or symptoms of laryngeal penetration/aspiration. 4. Mild pharyngeal dysphagia for regular solids marked by initiation of the pharyngeal swallow with hyolaryngeal elevation and excursion upon digital palpation. There was an immediate throat clear after the swallow indicative of laryngeal penetration/aspiration.

## 2022-06-29 NOTE — H&P ADULT - PROBLEM SELECTOR PLAN 1
UA +LE and WBCs.   - s/p ceftriaxone in ED  - continue ceftriaxone for 3 day course  - f/u UCx UA +LE and WBCs. Given Leukocytosis 12.4K and FTT with cachexia will treat empirically; Pt not a reliable historian;   - Ceftriaxone IV for 3 day course  - f/u UCx  - Monitor for sepsis and fever

## 2022-06-29 NOTE — H&P ADULT - ASSESSMENT
Jae Mckenna is an 89 y/o female with PMHx HTN, DM, HLD who presents to the ED after a fall, found to have acute UTI and failure to thrive, suspected due to being on the ground for 3 days without help. 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

## 2022-06-29 NOTE — H&P ADULT - NSHPPHYSICALEXAM_GEN_ALL_CORE
PHYSICAL EXAM:  GENERAL: Cachectic, lying in bed  HEAD: C-collar in place  EYES: EOMI, PERRLA, conjunctiva and sclera clear  ENT: Moist mucous membranes  NECK: Supple, No JVD  CHEST/LUNG: Clear to auscultation bilaterally; No rales, rhonchi, wheezing, or rubs. Unlabored respirations  HEART: Regular rate and rhythm; No murmurs, rubs, or gallops  ABDOMEN: Bowel sounds present; Soft, Nontender, Nondistended.  : +suprapubic tenderness  EXTREMITIES:  2+ Peripheral Pulses, brisk capillary refill. No clubbing, cyanosis, or edema  NERVOUS SYSTEM: Alert & Oriented X2 (name, location), speech clear. No deficits   MSK: FROM all 4 extremities, weakness in all extremities  SKIN: No rashes or lesions PHYSICAL EXAM:  GENERAL: Cachectic, lying in bed  HEAD: C-collar in place  EYES: EOMI, PERRLA, conjunctiva and sclera clear  ENT: dry mucous membranes  NECK: Supple, No JVD  CHEST/LUNG: Clear to auscultation bilaterally; No rales, rhonchi, wheezing, or rubs. Unlabored respirations  HEART: Regular rate and rhythm; No murmurs, rubs, or gallops  ABDOMEN: Bowel sounds present; Soft, Nontender, Nondistended.  : +suprapubic tenderness  EXTREMITIES:  2+ Peripheral Pulses, brisk capillary refill. No clubbing, cyanosis, or edema  NERVOUS SYSTEM: Alert & Oriented X2 (name, location), speech clear. No focal deficits   MSK: FROM all 4 extremities, weakness in all extremities  SKIN: No rashes or lesions    additional elements of PE below

## 2022-06-29 NOTE — PHYSICAL THERAPY INITIAL EVALUATION ADULT - ADDITIONAL COMMENTS
Patient lives alone in private home, + steps to negotiate. patient was independent in ADL prior to admission. Patient was not using assistive device prior to admission.

## 2022-06-29 NOTE — H&P ADULT - PROBLEM SELECTOR PLAN 7
Noted on CT lumbar.   - lidocaine patch  - tylenol PRN Hemoglobin 10 on admission.   - granddaughter thinks patient may have had blood or iron infusions in the past, unknown  - gaining collateral from patient's PCP may be helpful

## 2022-06-29 NOTE — H&P ADULT - NSHPLABSRESULTS_GEN_ALL_CORE
LABS:  06-28 @ 17:15 Creatine 166 U/L [25 - 170]  cret                        10.0   12.41 )-----------( 453      ( 28 Jun 2022 17:15 )             32.7     06-28    140  |  101  |  55<H>  ----------------------------<  124<H>  4.2   |  17<L>  |  0.83    Ca    10.3      28 Jun 2022 17:15    TPro  4.6<L>  /  Alb  2.4<L>  /  TBili  0.4  /  DBili  x   /  AST  51<H>  /  ALT  39<H>  /  AlkPhos  119  06-28 -personally interpreted EKG:    -personally reviewed labs:                        10.0   12.41 )-----------( 453      ( 28 Jun 2022 17:15 )             32.7     06-28    140  |  101  |  55<H>  ----------------------------<  124<H>  4.2   |  17<L>  |  0.83    Ca    10.3      28 Jun 2022 17:15    TPro  4.6<L>  /  Alb  2.4<L>  /  TBili  0.4  /  DBili  x   /  AST  51<H>  /  ALT  39<H>  /  AlkPhos  119  06-28    -personally interpreted CXR: -personally interpreted EKG: nsr 88bpm, qtc 438, no acute Tw or ST changes, no PACs or PVCs    -personally reviewed labs:                        10.0   12.41 )-----------( 453      ( 28 Jun 2022 17:15 )             32.7     06-28    140  |  101  |  55<H>  ----------------------------<  124<H>  4.2   |  17<L>  |  0.83    Ca    10.3      28 Jun 2022 17:15    TPro  4.6<L>  /  Alb  2.4<L>  /  TBili  0.4  /  DBili  x   /  AST  51<H>  /  ALT  39<H>  /  AlkPhos  119  06-28    -personally reviewed:  CT CHEST  CT CERVICAL SPINE  CT BRAIN  CT THORACIC SPINE  CT LUMBAR SPINE  PROCEDURE DATE: 06/28/2022  IMPRESSION:  CT Head:  No acute intracranial hemorrhage, mass effect, or midline shift.  CT Cervical, Thoracic, and Lumbar Spine:  Age-indeterminate fracture of the right L1 transverse process. Otherwise, no acute fractures are seen. No traumatic subluxation.  CT Chest:  Innumerable bilateral pulmonary nodules concerning for metastatic disease. A right breast soft tissue lesion measures up to 3.1 cm, concerning for primary malignancy.  Moderate left pleural effusion.  Retroperitoneal lymphadenopathy.  No evidence of rib fracture.

## 2022-06-29 NOTE — OCCUPATIONAL THERAPY INITIAL EVALUATION ADULT - DIAGNOSIS, OT EVAL
s/p fall, s/p UTI, s/p FTT, s/p right breast mass; decreased functional mobility, decreased ADL performance

## 2022-06-29 NOTE — CONSULT NOTE ADULT - SUBJECTIVE AND OBJECTIVE BOX
Interventional Radiology    Evaluate for Procedure:     HPI: 89y Female with PMHx HTN, DM Type 2, HLD who presents after a fall a/w likely acute UTI, failure to thrive c/b severe dehydration, hypoalbuminemia and cachexia; Found to have Rt breast mass with associated likely pulmonary metastases and likely malignant pleural effusion. IR consulted for biopsy of right breast mass.     Allergies:   Medications (Abx/Cardiac/Anticoagulation/Blood Products)    cefTRIAXone   IVPB: 100 mL/Hr IV Intermittent (06-28 @ 19:30)    Data:    T(C): 36.5  HR: 88  BP: 126/56  RR: 17  SpO2: 100%    -WBC 11.38 / HgB 9.2 / Hct 32.4 / Plt 373  -Na 141 / Cl 105 / BUN 50 / Glucose 124  -K 4.0 / CO2 22 / Cr 0.80  -ALT 34 / Alk Phos 110 / T.Bili 0.3  -INR 0.98 / PTT 28.2    Radiology: reviewed     Assessment/Plan:   -89y Female with PMHx HTN, DM Type 2, HLD who presents after a fall a/w likely acute UTI, failure to thrive c/b severe dehydration, hypoalbuminemia and cachexia; Found to have Rt breast mass with associated likely pulmonary metastases and likely malignant pleural effusion.     - case and imaging reviewed; If patient is A&Ox3 and can follow all commands, IR would be able to perform biopsy on right axillary lymph node and/or right breast mass under local anesthesia.  If patient is not A&Ox3, we would not be able to perform biopsy under local anesthesia.  If this is the case, consider anesthesia consult to further assess if patient can tolerate.    - primary team should continue to have GOC discussion with family and assess if biopsy results will .   - Feel free to reach out with any questions.   - I personally discussed the above with the primary team. 
HPI:  88 y/o female with MHx HTN, DM Type 2, HLD who presents to the ED after a fall. The patient is poor historian. Called granddaughter (listed in handoff). She reports the patient had fallen, likely 2 or 3 days ago. Family was unable to reach her so they called the fire department to break into the patient's home. The patient was found on the ground in her bathroom, moaning. The patient lives alone and has had all ADLs and IADLs intact, per granddaughter. Most of her care has been in [place] with PCP Dr. Cervantes. Granddaughter does not know patient's medications. The patient reports mild lower back pain, wore with movement, with no associated incontinence. She says that does not recall the event but reports no Abd pain, n/v/d, fever, cough, SOB, CP or palpitations. Also reports no HA and sore throat. She would like some tea to drink.     ED course: afebrile, HR 80s, -140s, saturating 97% on room air. Received 1L NS, ceftriaxone, and Tylenol.    (2022 05:55)    PAST MEDICAL & SURGICAL HISTORY:  HTN (hypertension)      DM (diabetes mellitus)      Hyperlipidemia      No significant past surgical history        Allergies    No Known Allergies      acetaminophen     Tablet .. 650 milliGRAM(s) Oral every 8 hours PRN  aluminum hydroxide/magnesium hydroxide/simethicone Suspension 30 milliLiter(s) Oral every 6 hours PRN  cefTRIAXone   IVPB 1000 milliGRAM(s) IV Intermittent every 24 hours  heparin SubCutaneous Injection - Peds 5000 Unit(s) SubCutaneous every 12 hours  lidocaine   4% Patch 1 Patch Transdermal daily  melatonin 3 milliGRAM(s) Oral at bedtime PRN  sodium chloride 0.9%. 1000 milliLiter(s) IV Continuous <Continuous>    SOCIAL HISTORY:  FAMILY HISTORY:  No pertinent family history in first degree relatives      Vital Signs Last 24 Hrs  T(C): 36.5 (2022 06:26), Max: 36.5 (2022 19:44)  T(F): 97.7 (2022 06:26), Max: 97.7 (2022 19:44)  HR: 88 (2022 06:26) (65 - 88)  BP: 126/56 (2022 06:26) (114/50 - 141/59)  RR: 17 (2022 06:26) (16 - 18)  SpO2: 100% (2022 06:26) (97% - 100%)    PHYSICAL EXAM:  Awake Alert Attentive Affect appropriate, Ox2 (person, place)  Pupils: PERRL  Motor- Moving all extremities well  C/o midline thoracic tenderness, not lumbar        LABS:                          9.2    11.38 )-----------( 373      ( 2022 13:09 )             32.4         140  |  101  |  55<H>  ----------------------------<  124<H>  4.2   |  17<L>  |  0.83    Ca    10.3      2022 17:15    TPro  4.6<L>  /  Alb  2.4<L>  /  TBili  0.4  /  DBili  x   /  AST  51<H>  /  ALT  39<H>  /  AlkPhos  119        Urinalysis Basic - ( 2022 17:45 )    Color: Yellow / Appearance: Slightly Turbid / S.025 / pH: x  Gluc: x / Ketone: Small  / Bili: Negative / Urobili: 3 mg/dL   Blood: x / Protein: Trace / Nitrite: Negative   Leuk Esterase: Large / RBC: 14 /HPF / WBC 20 /HPF   Sq Epi: x / Non Sq Epi: 9 /HPF / Bacteria: Negative        RADIOLOGY & ADDITIONAL STUDIES:  < from: CT Chest No Cont (22 @ 22:03) >    ACC: [id number] EXAM:  CT CHEST                        ACC: [id number] EXAM:  CT CERVICAL SPINE                        ACC: [id number] EXAM:  CT BRAIN                        ACC: [id number] EXAM:  CT THORACIC SPINE                        ACC: [id number] EXAM:  CT LUMBAR SPINE                          PROCEDURE DATE:  2022          INTERPRETATION:  CLINICAL INDICATION: Fall with back pain..    TECHNIQUE:  Noncontrast CT of the head was performed.  Sagittal and coronal reformats were created.    Noncontrast CT of the cervical, thoracic, and lumbar spine were obtained.  Sagittal and coronal reformats were created.    Noncontrast CT of the chest was obtained.  Sagittal and coronal reformats were created.    COMPARISON STUDY: CT head 2021    FINDINGS:    CT HEAD:    PARENCHYMA AND VENTRICLES: No acute intracranial hemorrhage, mass effect,   or midline shift. No hydrocephalus. Age appropriate involutional changes   and mild small vessel white matter changes. Gray-white matter   differentiation is maintained.  EXTRA-AXIAL: No abnormal extraaxial collection.  PARANASAL SINUSES: Within normal limits.  TYMPANOMASTOID CAVITIES: Within normal limits.  ORBITS: Bilateral cataract surgery.  CALVARIUM: No fracture. Mild left posterior temporal scalp soft tissue   swelling.  MISCELLANEOUS: None    CT CERVICAL SPINE:    No acute fracture or subluxation.    There are multilevel degenerative changes characterized by degenerative   disc disease and facet and uncinate hypertrophy. This results in   multilevel spinal canal stenosis and multilevel neural foraminal   narrowing.    There is no prevertebral soft tissue swelling. The paraspinal soft   tissues are unremarkable. The lung apices are clear.    CT THORACIC SPINE:    No acute fracture or subluxation.  Vertebral body heights and alignment are maintained. Mild multilevel   marginal osteophyte formation.  There is no significant bony spinal canal or neural foraminal narrowing.    There is no prevertebral soft tissue swelling. The paraspinal soft   tissues are unremarkable.    CT LUMBAR SPINE:    Age-indeterminate nondisplaced fracture of the right L1 transverse   process. No subluxation.  There are multilevel degenerative changes characterized by degenerative   disc disease and facet arthrosis, resulting in varying degrees of   multilevel bony spinal canal stenosis and neural foraminal narrowing.    CHEST:    LYMPH NODES: No mediastinal lymphadenopathy.    HEART/VASCULATURE: The heart is normal. No pericardial effusion. Aortic   valvular calcification. Aortic and coronary artery calcifications.    AIRWAYS/LUNGS/PLEURA: Trace mucoid secretions in the trachea. Multiple   bilateral pulmonary nodules concerning for metastatic disease, for   reference a left lower lobe nodule measures 8mm (2:65) and a right lower   lobe nodule measures 2.0 x 1.6 cm. Moderate left pleural effusion with   passive left lower lobe atelectasis.    VISUALIZED ABDOMEN : Nonspecific bilateral adrenal gland thickening.:   Diverticulosis. Retroperitoneal lymphadenopathy, for reference a   pericaval node measures 1.1 x 1.5 cm (4:157).    BONES/SOFT TISSUES: Soft tissue attenuating right breast mass measures   3.1 x 2.5 x 2.8 cm (2:62, 602:19). No evidence of rib fracture.    IMPRESSION:    CT Head:    No acute intracranial hemorrhage, mass effect, or midline shift.    CT Cervical, Thoracic, and Lumbar Spine:    Age-indeterminate fracture of the right L1 transverse process. Otherwise,   no acute fractures are seen. No traumatic subluxation.    CT Chest:    Innumerable bilateral pulmonary nodules concerning for metastatic   disease. A right breast soft tissue lesion measures up to 3.1 cm,   concerning for primary malignancy.    Moderate left pleural effusion.    Retroperitoneal lymphadenopathy.    No evidence of rib fracture.    --- End of Report ---          ABBIE BRINK MD; Resident Radiology  This document has been electronically signed.  MARITZA PALOMO MD; Attending Radiologist  This document has been electronically signed. 2022 11:38PM    < end of copied text >

## 2022-06-29 NOTE — H&P ADULT - PROBLEM SELECTOR PLAN 2
Dehydration from being on ground for three days after fall.   - s/p 1L NS in ED  - start NS 75cc/hr Dry oral MM; BUN: Scr ratio grater than 60  - s/p 1L NS in ED  - start NS 75cc/hr

## 2022-06-30 LAB
-  AMIKACIN: SIGNIFICANT CHANGE UP
-  AMOXICILLIN/CLAVULANIC ACID: SIGNIFICANT CHANGE UP
-  AMPICILLIN/SULBACTAM: SIGNIFICANT CHANGE UP
-  AMPICILLIN: SIGNIFICANT CHANGE UP
-  AZTREONAM: SIGNIFICANT CHANGE UP
-  CEFAZOLIN: SIGNIFICANT CHANGE UP
-  CEFEPIME: SIGNIFICANT CHANGE UP
-  CEFOXITIN: SIGNIFICANT CHANGE UP
-  CEFTRIAXONE: SIGNIFICANT CHANGE UP
-  CIPROFLOXACIN: SIGNIFICANT CHANGE UP
-  ERTAPENEM: SIGNIFICANT CHANGE UP
-  GENTAMICIN: SIGNIFICANT CHANGE UP
-  IMIPENEM: SIGNIFICANT CHANGE UP
-  LEVOFLOXACIN: SIGNIFICANT CHANGE UP
-  MEROPENEM: SIGNIFICANT CHANGE UP
-  NITROFURANTOIN: SIGNIFICANT CHANGE UP
-  PIPERACILLIN/TAZOBACTAM: SIGNIFICANT CHANGE UP
-  TIGECYCLINE: SIGNIFICANT CHANGE UP
-  TOBRAMYCIN: SIGNIFICANT CHANGE UP
-  TRIMETHOPRIM/SULFAMETHOXAZOLE: SIGNIFICANT CHANGE UP
ALBUMIN SERPL ELPH-MCNC: 1.8 G/DL — LOW (ref 3.3–5)
ALP SERPL-CCNC: 89 U/L — SIGNIFICANT CHANGE UP (ref 40–120)
ALT FLD-CCNC: 29 U/L — SIGNIFICANT CHANGE UP (ref 4–33)
ANION GAP SERPL CALC-SCNC: 9 MMOL/L — SIGNIFICANT CHANGE UP (ref 7–14)
AST SERPL-CCNC: 47 U/L — HIGH (ref 4–32)
BILIRUB SERPL-MCNC: 0.2 MG/DL — SIGNIFICANT CHANGE UP (ref 0.2–1.2)
BUN SERPL-MCNC: 38 MG/DL — HIGH (ref 7–23)
CALCIUM SERPL-MCNC: 9 MG/DL — SIGNIFICANT CHANGE UP (ref 8.4–10.5)
CHLORIDE SERPL-SCNC: 110 MMOL/L — HIGH (ref 98–107)
CO2 SERPL-SCNC: 23 MMOL/L — SIGNIFICANT CHANGE UP (ref 22–31)
CREAT SERPL-MCNC: 0.71 MG/DL — SIGNIFICANT CHANGE UP (ref 0.5–1.3)
CULTURE RESULTS: SIGNIFICANT CHANGE UP
EGFR: 81 ML/MIN/1.73M2 — SIGNIFICANT CHANGE UP
GLUCOSE SERPL-MCNC: 86 MG/DL — SIGNIFICANT CHANGE UP (ref 70–99)
HCT VFR BLD CALC: 28.6 % — LOW (ref 34.5–45)
HGB BLD-MCNC: 8.9 G/DL — LOW (ref 11.5–15.5)
MAGNESIUM SERPL-MCNC: 1.7 MG/DL — SIGNIFICANT CHANGE UP (ref 1.6–2.6)
MCHC RBC-ENTMCNC: 24.1 PG — LOW (ref 27–34)
MCHC RBC-ENTMCNC: 31.1 GM/DL — LOW (ref 32–36)
MCV RBC AUTO: 77.3 FL — LOW (ref 80–100)
METHOD TYPE: SIGNIFICANT CHANGE UP
NRBC # BLD: 0 /100 WBCS — SIGNIFICANT CHANGE UP
NRBC # FLD: 0 K/UL — SIGNIFICANT CHANGE UP
ORGANISM # SPEC MICROSCOPIC CNT: SIGNIFICANT CHANGE UP
ORGANISM # SPEC MICROSCOPIC CNT: SIGNIFICANT CHANGE UP
PHOSPHATE SERPL-MCNC: 2.1 MG/DL — LOW (ref 2.5–4.5)
PLATELET # BLD AUTO: 353 K/UL — SIGNIFICANT CHANGE UP (ref 150–400)
POTASSIUM SERPL-MCNC: 4.8 MMOL/L — SIGNIFICANT CHANGE UP (ref 3.5–5.3)
POTASSIUM SERPL-SCNC: 4.8 MMOL/L — SIGNIFICANT CHANGE UP (ref 3.5–5.3)
PROT SERPL-MCNC: 3.8 G/DL — LOW (ref 6–8.3)
RBC # BLD: 3.7 M/UL — LOW (ref 3.8–5.2)
RBC # FLD: 19.4 % — HIGH (ref 10.3–14.5)
SODIUM SERPL-SCNC: 142 MMOL/L — SIGNIFICANT CHANGE UP (ref 135–145)
SPECIMEN SOURCE: SIGNIFICANT CHANGE UP
WBC # BLD: 9.45 K/UL — SIGNIFICANT CHANGE UP (ref 3.8–10.5)
WBC # FLD AUTO: 9.45 K/UL — SIGNIFICANT CHANGE UP (ref 3.8–10.5)

## 2022-06-30 PROCEDURE — 99233 SBSQ HOSP IP/OBS HIGH 50: CPT

## 2022-06-30 RX ORDER — POTASSIUM PHOSPHATE, MONOBASIC POTASSIUM PHOSPHATE, DIBASIC 236; 224 MG/ML; MG/ML
15 INJECTION, SOLUTION INTRAVENOUS ONCE
Refills: 0 | Status: COMPLETED | OUTPATIENT
Start: 2022-06-30 | End: 2022-06-30

## 2022-06-30 RX ORDER — HEPARIN SODIUM 5000 [USP'U]/ML
5000 INJECTION INTRAVENOUS; SUBCUTANEOUS EVERY 12 HOURS
Refills: 0 | Status: DISCONTINUED | OUTPATIENT
Start: 2022-06-30 | End: 2022-07-07

## 2022-06-30 RX ADMIN — CEFTRIAXONE 100 MILLIGRAM(S): 500 INJECTION, POWDER, FOR SOLUTION INTRAMUSCULAR; INTRAVENOUS at 22:35

## 2022-06-30 RX ADMIN — POTASSIUM PHOSPHATE, MONOBASIC POTASSIUM PHOSPHATE, DIBASIC 62.5 MILLIMOLE(S): 236; 224 INJECTION, SOLUTION INTRAVENOUS at 17:23

## 2022-06-30 RX ADMIN — HEPARIN SODIUM 5000 UNIT(S): 5000 INJECTION INTRAVENOUS; SUBCUTANEOUS at 17:25

## 2022-06-30 RX ADMIN — HEPARIN SODIUM 5000 UNIT(S): 5000 INJECTION INTRAVENOUS; SUBCUTANEOUS at 06:25

## 2022-06-30 NOTE — DIETITIAN INITIAL EVALUATION ADULT - PERTINENT MEDS FT
MEDICATIONS  (STANDING):  cefTRIAXone   IVPB 1000 milliGRAM(s) IV Intermittent every 24 hours  heparin   Injectable 5000 Unit(s) SubCutaneous every 12 hours  lidocaine   4% Patch 1 Patch Transdermal daily  sodium chloride 0.9%. 1000 milliLiter(s) (75 mL/Hr) IV Continuous <Continuous>    MEDICATIONS  (PRN):  acetaminophen     Tablet .. 650 milliGRAM(s) Oral every 8 hours PRN Mild Pain (1 - 3)  aluminum hydroxide/magnesium hydroxide/simethicone Suspension 30 milliLiter(s) Oral every 6 hours PRN Upset Stomach  melatonin 3 milliGRAM(s) Oral at bedtime PRN Insomnia

## 2022-06-30 NOTE — DIETITIAN INITIAL EVALUATION ADULT - OTHER INFO
Per chart review, 89 y/o female with MHx HTN, DM Type 2, HLD who presents after a fall a/w likely acute UTI failure to thrive c/b severe dehydration, hypoalbuminemia and cachexia; Found to have Rt breast mass with associated likely pulmonary metastases and likely malignant pleural effusion.     Pt seen at bedside, appears confused in setting of AMS. Limited subjective assessment obtained. Per H&P, the patient lives alone and has had all ADLs and IADLs intact. Reports fair appetite PTA. Per flowsheet, patient consumed less than 50% of her meals in house, likely not meeting protein calorie needs. Pt noted with poor PO intake in house. Offered nutritional supplements, pt is not interested. Food preferences explored, pt is amenable to provision of magic cups x3 daily (870kcal, 27gm). S/P bedside assessment 6/29 recommending pureed with thin liquids. Ordered magnesium hydroxide for stomach upset. No bowel regimen ordered at this time. Labs notable with BUN 38H in setting of dehydration, Phos 2.1L (6/30/22) possibly due to poor PO intake. Recommend behavorial health consult for possible appetite stimulant per MD discretion.

## 2022-06-30 NOTE — PROGRESS NOTE ADULT - PROBLEM SELECTOR PLAN 4
New diagnosis; CT chest significant for innumerable bilateral pulmonary nodules concerning for metastatic disease. A right breast soft tissue lesion measures up to 3.1 cm, concerning for primary malignancy. Moderate left pleural effusion. Retroperitoneal lymphadenopathy. Suspect breast cancer, now with possible metastases to lung given multiple pulmonary nodules.  - Discussed findings with patient today 6/30, states she wants to further discuss with her granddaughter. I discussed with Granddaughter Yumiko who plans to visit her granddmother later today and have a family discussion with patient and her uncle (lives in [place]), will follow up with family discussion tomorrow  - Plan for potential breast mass biopsy if family agreeable, may requiring anesthesia if patient not AOx3 and following commands as per IR note  - Consider hematology/oncology consult in AM pending family discussions

## 2022-06-30 NOTE — PROGRESS NOTE ADULT - PROBLEM SELECTOR PLAN 9
DVT ppx: Heparin sq bid  Diet: Pureed diet with thin liquids - as per S&S evaluation   Code status: full code

## 2022-06-30 NOTE — DIETITIAN INITIAL EVALUATION ADULT - NS FNS DIET ORDER
Diet, Pureed:   Consistent Carbohydrate {No Snacks} (CSTCHO)  DASH/TLC {Sodium & Cholesterol Restricted} (DASH) (06-29-22 @ 10:54)

## 2022-06-30 NOTE — DIETITIAN INITIAL EVALUATION ADULT - ADD RECOMMEND
1) Recommend liberalized diet w/o therapeutic restrictions to encourage PO intake   2) Add magic cup 3x daily    3) Recommend consult behavioral health for possible appetite stimulant given pt has poor appetite   4) If patient continues to demonstrates poor PO intake, consider enteral nutrition if within the GOC.   5) Assist/encourage PO intake as tolerated. Document % PO/supplement intake.   6) Phosphate repletion given low phos (2.1L).

## 2022-06-30 NOTE — DIETITIAN NUTRITION RISK NOTIFICATION - TREATMENT: THE FOLLOWING DIET HAS BEEN RECOMMENDED
Diet, Pureed:   Consistent Carbohydrate {No Snacks} (CSTCHO)  DASH/TLC {Sodium & Cholesterol Restricted} (DASH) (06-29-22 @ 10:54) [Active]

## 2022-06-30 NOTE — DIETITIAN INITIAL EVALUATION ADULT - PERTINENT LABORATORY DATA
06-30    142  |  110<H>  |  38<H>  ----------------------------<  86  4.8   |  23  |  0.71    Ca    9.0      30 Jun 2022 06:45  Phos  2.1     06-30  Mg     1.70     06-30    TPro  3.8<L>  /  Alb  1.8<L>  /  TBili  0.2  /  DBili  x   /  AST  47<H>  /  ALT  29  /  AlkPhos  89  06-30  POCT Blood Glucose.: 128 mg/dL (06-29-22 @ 20:53)

## 2022-07-01 LAB
ANION GAP SERPL CALC-SCNC: 9 MMOL/L — SIGNIFICANT CHANGE UP (ref 7–14)
BUN SERPL-MCNC: 29 MG/DL — HIGH (ref 7–23)
CALCIUM SERPL-MCNC: 8.8 MG/DL — SIGNIFICANT CHANGE UP (ref 8.4–10.5)
CHLORIDE SERPL-SCNC: 109 MMOL/L — HIGH (ref 98–107)
CO2 SERPL-SCNC: 23 MMOL/L — SIGNIFICANT CHANGE UP (ref 22–31)
CREAT SERPL-MCNC: 0.69 MG/DL — SIGNIFICANT CHANGE UP (ref 0.5–1.3)
EGFR: 83 ML/MIN/1.73M2 — SIGNIFICANT CHANGE UP
GLUCOSE SERPL-MCNC: 93 MG/DL — SIGNIFICANT CHANGE UP (ref 70–99)
HCT VFR BLD CALC: 28.7 % — LOW (ref 34.5–45)
HGB BLD-MCNC: 9 G/DL — LOW (ref 11.5–15.5)
MAGNESIUM SERPL-MCNC: 1.5 MG/DL — LOW (ref 1.6–2.6)
MCHC RBC-ENTMCNC: 24.6 PG — LOW (ref 27–34)
MCHC RBC-ENTMCNC: 31.4 GM/DL — LOW (ref 32–36)
MCV RBC AUTO: 78.4 FL — LOW (ref 80–100)
NRBC # BLD: 0 /100 WBCS — SIGNIFICANT CHANGE UP
NRBC # FLD: 0 K/UL — SIGNIFICANT CHANGE UP
PHOSPHATE SERPL-MCNC: 2.7 MG/DL — SIGNIFICANT CHANGE UP (ref 2.5–4.5)
PLATELET # BLD AUTO: 348 K/UL — SIGNIFICANT CHANGE UP (ref 150–400)
POTASSIUM SERPL-MCNC: 4.2 MMOL/L — SIGNIFICANT CHANGE UP (ref 3.5–5.3)
POTASSIUM SERPL-SCNC: 4.2 MMOL/L — SIGNIFICANT CHANGE UP (ref 3.5–5.3)
RBC # BLD: 3.66 M/UL — LOW (ref 3.8–5.2)
RBC # FLD: 19.5 % — HIGH (ref 10.3–14.5)
SODIUM SERPL-SCNC: 141 MMOL/L — SIGNIFICANT CHANGE UP (ref 135–145)
WBC # BLD: 9.28 K/UL — SIGNIFICANT CHANGE UP (ref 3.8–10.5)
WBC # FLD AUTO: 9.28 K/UL — SIGNIFICANT CHANGE UP (ref 3.8–10.5)

## 2022-07-01 PROCEDURE — 99233 SBSQ HOSP IP/OBS HIGH 50: CPT

## 2022-07-01 RX ORDER — MAGNESIUM SULFATE 500 MG/ML
1 VIAL (ML) INJECTION ONCE
Refills: 0 | Status: COMPLETED | OUTPATIENT
Start: 2022-07-01 | End: 2022-07-01

## 2022-07-01 RX ADMIN — HEPARIN SODIUM 5000 UNIT(S): 5000 INJECTION INTRAVENOUS; SUBCUTANEOUS at 16:58

## 2022-07-01 RX ADMIN — HEPARIN SODIUM 5000 UNIT(S): 5000 INJECTION INTRAVENOUS; SUBCUTANEOUS at 05:45

## 2022-07-01 RX ADMIN — CEFTRIAXONE 100 MILLIGRAM(S): 500 INJECTION, POWDER, FOR SOLUTION INTRAMUSCULAR; INTRAVENOUS at 20:33

## 2022-07-01 RX ADMIN — LIDOCAINE 1 PATCH: 4 CREAM TOPICAL at 18:09

## 2022-07-01 RX ADMIN — LIDOCAINE 1 PATCH: 4 CREAM TOPICAL at 11:34

## 2022-07-01 RX ADMIN — LIDOCAINE 1 PATCH: 4 CREAM TOPICAL at 23:00

## 2022-07-01 RX ADMIN — Medication 100 GRAM(S): at 08:52

## 2022-07-01 NOTE — PROGRESS NOTE ADULT - PROBLEM SELECTOR PLAN 4
New diagnosis; CT chest significant for innumerable bilateral pulmonary nodules concerning for metastatic disease. A right breast soft tissue lesion measures up to 3.1 cm, concerning for primary malignancy. Moderate left pleural effusion. Retroperitoneal lymphadenopathy. Suspect breast cancer, now with possible metastases to lung given multiple pulmonary nodules.    - Extensively discussed with patient, son (Keyon) and Granddaughter (Yumiko) today. Patient AOx3 at time of encounter, communicated findings with patient, patient states she doesn't want to pursue additional workup at this time, she states she may consider in the future as outpatient. Discussed patient's wishes with Son and granddaughter. Son (HCP) respectful of patient's decision. Will provide [facility] number to patient on discharge if they are interested in pursuing outpatient biopsy [phone]). Consideration of outpatient Oncology evaluation if patient changes her mind in the future

## 2022-07-01 NOTE — CHART NOTE - NSCHARTNOTEFT_GEN_A_CORE
Patient is a 88 y/o female with MHx HTN, DM Type 2, HLD who presents after a fall a/w likely acute UTI failure to thrive c/b severe dehydration, hypoalbuminemia and cachexia; Found to have Rt breast mass with associated likely pulmonary metastases and likely malignant pleural effusion.    IR was consulted on 6/29/22 for possible biopsy of right axillary nodes and/or right breast lesion. Son of patient (who is the HCP) was unsure if he would want a biopsy.  Was decided that primary team would have a GOC conversation with the family and decide if biopsy would .     I personally spoke with the primary team today (7/1/22) who reported that after conversation with family, they would not like to go forward with biopsy.  I informed primary team that if family/patient changes decision, to give a call to [facility] and have biopsy completed as an outpatient (Number for [facility]: [phone]).     Plan:  - after GOC discussion, family does not want biopsy  - if family changes decision, can contact [facility] for outpatient management ([phone])  - please reach out with any questions

## 2022-07-02 LAB
ANION GAP SERPL CALC-SCNC: 9 MMOL/L — SIGNIFICANT CHANGE UP (ref 7–14)
BUN SERPL-MCNC: 24 MG/DL — HIGH (ref 7–23)
CALCIUM SERPL-MCNC: 8.6 MG/DL — SIGNIFICANT CHANGE UP (ref 8.4–10.5)
CHLORIDE SERPL-SCNC: 105 MMOL/L — SIGNIFICANT CHANGE UP (ref 98–107)
CO2 SERPL-SCNC: 24 MMOL/L — SIGNIFICANT CHANGE UP (ref 22–31)
CREAT SERPL-MCNC: 0.7 MG/DL — SIGNIFICANT CHANGE UP (ref 0.5–1.3)
EGFR: 83 ML/MIN/1.73M2 — SIGNIFICANT CHANGE UP
GLUCOSE BLDC GLUCOMTR-MCNC: 164 MG/DL — HIGH (ref 70–99)
GLUCOSE BLDC GLUCOMTR-MCNC: 93 MG/DL — SIGNIFICANT CHANGE UP (ref 70–99)
GLUCOSE SERPL-MCNC: 94 MG/DL — SIGNIFICANT CHANGE UP (ref 70–99)
HCT VFR BLD CALC: 27.8 % — LOW (ref 34.5–45)
HGB BLD-MCNC: 8.7 G/DL — LOW (ref 11.5–15.5)
MAGNESIUM SERPL-MCNC: 1.6 MG/DL — SIGNIFICANT CHANGE UP (ref 1.6–2.6)
MCHC RBC-ENTMCNC: 24.5 PG — LOW (ref 27–34)
MCHC RBC-ENTMCNC: 31.3 GM/DL — LOW (ref 32–36)
MCV RBC AUTO: 78.3 FL — LOW (ref 80–100)
NRBC # BLD: 0 /100 WBCS — SIGNIFICANT CHANGE UP
NRBC # FLD: 0 K/UL — SIGNIFICANT CHANGE UP
PHOSPHATE SERPL-MCNC: 2.3 MG/DL — LOW (ref 2.5–4.5)
PLATELET # BLD AUTO: 328 K/UL — SIGNIFICANT CHANGE UP (ref 150–400)
POTASSIUM SERPL-MCNC: 4.5 MMOL/L — SIGNIFICANT CHANGE UP (ref 3.5–5.3)
POTASSIUM SERPL-SCNC: 4.5 MMOL/L — SIGNIFICANT CHANGE UP (ref 3.5–5.3)
RBC # BLD: 3.55 M/UL — LOW (ref 3.8–5.2)
RBC # FLD: 19.7 % — HIGH (ref 10.3–14.5)
SARS-COV-2 RNA SPEC QL NAA+PROBE: SIGNIFICANT CHANGE UP
SODIUM SERPL-SCNC: 138 MMOL/L — SIGNIFICANT CHANGE UP (ref 135–145)
WBC # BLD: 9.16 K/UL — SIGNIFICANT CHANGE UP (ref 3.8–10.5)
WBC # FLD AUTO: 9.16 K/UL — SIGNIFICANT CHANGE UP (ref 3.8–10.5)

## 2022-07-02 PROCEDURE — 99232 SBSQ HOSP IP/OBS MODERATE 35: CPT

## 2022-07-02 RX ORDER — SODIUM,POTASSIUM PHOSPHATES 278-250MG
1 POWDER IN PACKET (EA) ORAL ONCE
Refills: 0 | Status: COMPLETED | OUTPATIENT
Start: 2022-07-02 | End: 2022-07-02

## 2022-07-02 RX ADMIN — LIDOCAINE 1 PATCH: 4 CREAM TOPICAL at 12:10

## 2022-07-02 RX ADMIN — Medication 1 PACKET(S): at 09:14

## 2022-07-02 RX ADMIN — HEPARIN SODIUM 5000 UNIT(S): 5000 INJECTION INTRAVENOUS; SUBCUTANEOUS at 05:32

## 2022-07-02 RX ADMIN — LIDOCAINE 1 PATCH: 4 CREAM TOPICAL at 19:55

## 2022-07-02 RX ADMIN — HEPARIN SODIUM 5000 UNIT(S): 5000 INJECTION INTRAVENOUS; SUBCUTANEOUS at 17:10

## 2022-07-02 NOTE — PROGRESS NOTE ADULT - PROBLEM SELECTOR PLAN 4
New diagnosis; CT chest significant for innumerable bilateral pulmonary nodules concerning for metastatic disease. A right breast soft tissue lesion measures up to 3.1 cm, concerning for primary malignancy. Moderate left pleural effusion. Retroperitoneal lymphadenopathy. Suspect breast cancer, now with possible metastases to lung given multiple pulmonary nodules.    - Extensively discussed with patient, son (Keyon) and Granddaughter (Yumiko). Patient AOx3 at time of encounter, communicated findings with patient, patient states she doesn't want to pursue additional workup at this time, she states she may consider in the future as outpatient. Discussed patient's wishes with Son and granddaughter. Son (HCP) respectful of patient's decision. Provided [facility]AM number to granddaughter and plan to include on discharge if they are interested in pursuing outpatient biopsy [phone]). Consideration of outpatient Oncology evaluation if patient changes her mind in the future

## 2022-07-03 ENCOUNTER — TRANSCRIPTION ENCOUNTER (OUTPATIENT)
Age: 87
End: 2022-07-03

## 2022-07-03 LAB
GLUCOSE BLDC GLUCOMTR-MCNC: 103 MG/DL — HIGH (ref 70–99)
GLUCOSE BLDC GLUCOMTR-MCNC: 116 MG/DL — HIGH (ref 70–99)
GLUCOSE BLDC GLUCOMTR-MCNC: 124 MG/DL — HIGH (ref 70–99)
GLUCOSE BLDC GLUCOMTR-MCNC: 92 MG/DL — SIGNIFICANT CHANGE UP (ref 70–99)
GLUCOSE BLDC GLUCOMTR-MCNC: 94 MG/DL — SIGNIFICANT CHANGE UP (ref 70–99)

## 2022-07-03 PROCEDURE — 99232 SBSQ HOSP IP/OBS MODERATE 35: CPT

## 2022-07-03 RX ORDER — POLYETHYLENE GLYCOL 3350 17 G/17G
17 POWDER, FOR SOLUTION ORAL ONCE
Refills: 0 | Status: COMPLETED | OUTPATIENT
Start: 2022-07-03 | End: 2022-07-03

## 2022-07-03 RX ORDER — SENNA PLUS 8.6 MG/1
1 TABLET ORAL ONCE
Refills: 0 | Status: COMPLETED | OUTPATIENT
Start: 2022-07-03 | End: 2022-07-03

## 2022-07-03 RX ADMIN — HEPARIN SODIUM 5000 UNIT(S): 5000 INJECTION INTRAVENOUS; SUBCUTANEOUS at 05:14

## 2022-07-03 RX ADMIN — LIDOCAINE 1 PATCH: 4 CREAM TOPICAL at 00:15

## 2022-07-03 RX ADMIN — LIDOCAINE 1 PATCH: 4 CREAM TOPICAL at 19:30

## 2022-07-03 RX ADMIN — LIDOCAINE 1 PATCH: 4 CREAM TOPICAL at 11:43

## 2022-07-03 RX ADMIN — HEPARIN SODIUM 5000 UNIT(S): 5000 INJECTION INTRAVENOUS; SUBCUTANEOUS at 17:43

## 2022-07-03 RX ADMIN — SENNA PLUS 1 TABLET(S): 8.6 TABLET ORAL at 13:48

## 2022-07-03 RX ADMIN — POLYETHYLENE GLYCOL 3350 17 GRAM(S): 17 POWDER, FOR SOLUTION ORAL at 13:49

## 2022-07-03 RX ADMIN — LIDOCAINE 1 PATCH: 4 CREAM TOPICAL at 23:15

## 2022-07-03 RX ADMIN — Medication 30 MILLILITER(S): at 05:14

## 2022-07-03 NOTE — DISCHARGE NOTE PROVIDER - CARE PROVIDER_API CALL
PRIMARY CARE DOCTOR,   PRIMARY [facility]ARE CLINIC  Phone: (   )    -  Fax: (   )    -  Follow Up Time: 2 weeks    INTERVENTIONAL RADIOLOGY TEAM,   [facility]  Phone: [phone]  Fax: (   )    -  Follow Up Time:

## 2022-07-03 NOTE — DISCHARGE NOTE PROVIDER - HOSPITAL COURSE
89F h/o HTN, DM, HLD presents after a fall found to have acute UTI and failure to thrive, suspected due to being on the ground for 3 days without help.      Acute UTI.   - UA +LE and WBCs. Given Leukocytosis and FTT with cachexia will treat empirically; Pt not a reliable historian  - Ceftriaxone IV, completed antibiotics course; UCx w/ E. Coli    Breast mass.   - New diagnosis CT chest significant for innumerable bilateral pulmonary nodules concerning for metastatic disease. A right breast soft tissue lesion measures up to 3.1 cm, concerning for malignancy. Moderate L pleural effusion. Retroperitoneal LAD. Suspect breast CA, now with possible metastases to lung given multiple pulmonary nodules.  - Extensively discussed with patient, son (Keyon) and Granddaughter (Yumiko). Patient AOx3 at time of encounter, communicated findings with patient, patient states she doesn't want to pursue additional workup at this time, she states she may consider in the future as outpatient. Discussed patient's wishes with Son and granddaughter. Son (HCP) respectful of patient's decision    Fracture of transverse process of lumbar vertebra.   - CT Cervical, Thoracic, and Lumbar Spine: Age-indeterminate fracture of the right L1 transverse process.  - Lidocaine patch PRN, Tylenol PRN  - Spine surgery consulted no NSG intervention and no brace required  - PT eval appreciated recommending rehab.    Generalized weakness/FTT   - In setting of likely malignancy with mets; Likely exacerbated by dehydration; No evidence of rhabdo, CPK WNL  - CT chest significant for trace mucoid secretions in the trachea c/f potential dysphagia/ aspiration S/S rec pureed and thin liquids diet  - Hypoalbuminemia, severe dehydration, gen weakness, suspected metastatic malignancy  - Nutrition consult requested, continue to encourage PO intake.    Dispo - Rehab 89F h/o HTN, DM, HLD presents after a fall found to have acute UTI and failure to thrive, suspected due to being on the ground for 3 days without help.      Acute UTI.   - UA +LE and WBCs. Given Leukocytosis and FTT with cachexia will treat empirically; Pt not a reliable historian  - Ceftriaxone IV, completed antibiotics course; UCx w/ E. Coli  - On discharge, you remained clinically stable, without any urinary symptoms    Breast mass.   - New diagnosis CT chest significant for innumerable bilateral pulmonary nodules concerning for metastatic disease. A right breast soft tissue lesion measures up to 3.1 cm, concerning for malignancy. Moderate L pleural effusion. Retroperitoneal LAD. Suspect breast CA, now with possible metastases to lung given multiple pulmonary nodules.  - Extensively discussed with patient, son (Keyon) and Granddaughter (Yumiko). Patient AOx3 at time of encounter, communicated findings with patient, patient states she doesn't want to pursue additional workup at this time, she states she may consider in the future as outpatient. Discussed patient's wishes with Son and granddaughter. Son (HCP) respectful of patient's decision    Fracture of transverse process of lumbar vertebra.   - CT Cervical, Thoracic, and Lumbar Spine: Age-indeterminate fracture of the right L1 transverse process.  - Lidocaine patch PRN, Tylenol PRN  - Spine surgery consulted no NSG intervention and no brace required  - PT eval appreciated recommending rehab.    Generalized weakness/FTT   - In setting of likely malignancy with mets; Likely exacerbated by dehydration; No evidence of rhabdo, CPK WNL  - CT chest significant for trace mucoid secretions in the trachea c/f potential dysphagia/ aspiration S/S rec pureed and thin liquids diet  - Hypoalbuminemia, severe dehydration, gen weakness, suspected metastatic malignancy  - Nutrition consult requested, continue to encourage PO intake.     Orthostatic hypotension.   -  Now RESOLVED  Patient w/ orthostatic hypotension 7/5, likely 2/2 to poor po intake. Patient reports she has not been eating and drinking well in setting of not liking her pureed diet.     - repeat S&S eval to assess for advancement of diet--> advanced to regular diet  - s/p 1L NS bolus, followed by NS IVF 75cc/hr, discontinued patient now orthostatic negative 7/6  - repeat orthostatics negative 7/7    Diabetes Mellitus  - Patient blood sugar level has been well controlled here during hospital stay without any coverage.  - Patient instructed to follow up with PCP and to check sugar level routinely upon discharge  - Low carbohydrate diet also recommended    Dispo - Rehab. Patient is hemodynamically stable, and clinically stable to be discharged today. Case discussed with covering Medicine Attg.

## 2022-07-03 NOTE — DISCHARGE NOTE PROVIDER - NSDCFUADDAPPT_GEN_ALL_CORE_FT
Please follow up with your Primary Care Doctor within  2weeks upon discharge  Please call to follow up with IR if you change your mind and want to pursue outpatient biopsy

## 2022-07-03 NOTE — DISCHARGE NOTE PROVIDER - PROVIDER TOKENS
FREE:[LAST:[PRIMARY CARE DOCTOR],PHONE:[(   )    -],FAX:[(   )    -],ADDRESS:[PRIMARY [facility]ARE CLINIC],FOLLOWUP:[2 weeks]],FREE:[LAST:[INTERVENTIONAL RADIOLOGY TEAM],PHONE:[phone]],FAX:[(   )    -],ADDRESS:[[facility]]]

## 2022-07-03 NOTE — PROGRESS NOTE ADULT - PROBLEM SELECTOR PLAN 4
New diagnosis; CT chest significant for innumerable bilateral pulmonary nodules concerning for metastatic disease. A right breast soft tissue lesion measures up to 3.1 cm, concerning for primary malignancy. Moderate left pleural effusion. Retroperitoneal lymphadenopathy. Suspect breast cancer, now with possible metastases to lung given multiple pulmonary nodules.    - Extensively discussed with patient, son (Keyon) and Granddaughter (Yumiko). Patient AOx3 at time of encounter, communicated findings with patient, patient states she doesn't want to pursue additional workup at this time, she states she may consider in the future as outpatient. Discussed patient's wishes with Son and granddaughter. Son (HCP) respectful of patient's decision. Provided CFAM number to granddaughter and plan to include on discharge if they are interested in pursuing outpatient biopsy [phone]). Consideration of outpatient Oncology evaluation if patient changes her mind in the future

## 2022-07-03 NOTE — DISCHARGE NOTE PROVIDER - NSDCCPCAREPLAN_GEN_ALL_CORE_FT
PRINCIPAL DISCHARGE DIAGNOSIS  Diagnosis: Acute UTI  Assessment and Plan of Treatment: You were started on antibiotics for a urinary infection. To help prevent future infections maintain healthy hygiene and avoid taking long baths. Wipe from front to back and empty your bladder frequently by keeping yourself properly hydrated.   Monitor for signs/symptoms of infection, such as, fever/chills, burning/pain with urination, urinary frequency/hesitancy, cloudy urine, or blood in urine and follow-up with your primary care provider as outpatient for further care.      SECONDARY DISCHARGE DIAGNOSES  Diagnosis: Breast mass  Assessment and Plan of Treatment: You were found to have a breat mass - If upon your wishes you would like to pursue work-up in the setting of suspicion for cancer, you can follow-up for biopsy as outpatient.    Diagnosis: Fracture of transverse process of lumbar vertebra  Assessment and Plan of Treatment: No surgery or brace warranted at this time per spine surgery.   Please maintain a safe environment where you reside. Place night lights in the hallways and non-slip rugs/mats on hard surfaces. It is recommended that you move in a careful manner to prevent future events. Perform any exercises recommended by physical therapy and follow-up with your primary care provider.    Diagnosis: Adult failure to thrive  Assessment and Plan of Treatment: Upon discharge from the hospital please be sure to eat and drink adequately to maintain a stable blood pressure and optimal nutrition. Follow-up with your primary care provider as outpatient within 1-2 weeks to further monitor your blood-work. Maintain a safe environment and avoid standing up too quickly in order to prevent falls. Be aware of presistent dizziness, nausea/vomiting, fainting, changes in heart rate/blood pressure, and similar events, and return to emergency department if such signs/symptoms persist.     PRINCIPAL DISCHARGE DIAGNOSIS  Diagnosis: Acute UTI  Assessment and Plan of Treatment: You were started on antibiotics for a urinary infection. To help prevent future infections maintain healthy hygiene and avoid taking long baths. Wipe from front to back and empty your bladder frequently by keeping yourself properly hydrated.   Monitor for signs/symptoms of infection, such as, fever/chills, burning/pain with urination, urinary frequency/hesitancy, cloudy urine, or blood in urine and follow-up with your primary care provider as outpatient for further care.      SECONDARY DISCHARGE DIAGNOSES  Diagnosis: Adult failure to thrive  Assessment and Plan of Treatment: Upon discharge from the hospital please be sure to eat and drink adequately to maintain a stable blood pressure and optimal nutrition. Follow-up with your primary care provider as outpatient within 1-2 weeks to further monitor your blood-work. Maintain a safe environment and avoid standing up too quickly in order to prevent falls. Be aware of presistent dizziness, nausea/vomiting, fainting, changes in heart rate/blood pressure, and similar events, and return to emergency department if such signs/symptoms persist.    Diagnosis: Breast mass  Assessment and Plan of Treatment: You were found to have a breat mass - If upon your wishes you would like to pursue work-up in the setting of suspicion for cancer, you can follow-up for biopsy as outpatient.    Diagnosis: Orthostatic hypotension  Assessment and Plan of Treatment: You were noted to have orthostatic hypotension during your hospital stay. You were given some intravenous Fluids, and your symptoms improved.  - Please follow up with your Primary Care Doctor regarding when it is safe to resume your blood pressure medications  - Please hold your home medication (Norvasc) for now until otherwise instructed by your physician  - Check your Blood pressure routinely 3 times daily  - Please avoid getting up too quickly when layind down, to seating up, and then to standing. take a few minutes until you feel totally fine without any diziness and/or palpitations.  - Highly encourage to keep drinking enough water throughout the day so you remain well hydrated    Diagnosis: Fracture of transverse process of lumbar vertebra  Assessment and Plan of Treatment: No surgery or brace warranted at this time per spine surgery.   Please maintain a safe environment where you reside. Place night lights in the hallways and non-slip rugs/mats on hard surfaces. It is recommended that you move in a careful manner to prevent future events. Perform any exercises recommended by physical therapy and follow-up with your primary care provider.    Diagnosis: Diabetes mellitus  Assessment and Plan of Treatment: Your finger stick blood sugar level has been well controlled during your hospital stay without any diabetic medications coverage.  - Low carbohydrate diet recommended  - Please check your blood sugar level 3 times daily  - PLease follow up with your Primary Care Doctor regarding when or if it is safe to resume your home diabetic regimen.

## 2022-07-03 NOTE — DISCHARGE NOTE PROVIDER - NSDCQMSTROKE_NEU_ALL_CORE
04/11/19        Tanja Veras  [address] [facility]      Dear Aury Faustbinder,    Our records indicate that you have outstanding lab work and or testing that was ordered for you and has not yet been completed:  Orders Placed This Encounter      Assa No

## 2022-07-03 NOTE — DISCHARGE NOTE PROVIDER - NSDCMRMEDTOKEN_GEN_ALL_CORE_FT
amLODIPine 10 mg oral tablet: 1 tab(s) orally once a day  FLUTICASONE PROPIONATE 50MCG RX SPR:   pioglitazone-metformin 15 mg-500 mg oral tablet: 1 tab(s) orally 3 times a day  Protonix 40 mg oral delayed release tablet: 1 tab(s) orally once a day  SIMVASTATIN 40 MG TABLET: 1 TABLET EVERY EVENING ORALLY ONCE A DAY 90 DAYS   FLUTICASONE PROPIONATE 50MCG RX SPR:   lidocaine 4% topical film: Apply topically to affected area once a day  Protonix 40 mg oral delayed release tablet: 1 tab(s) orally once a day  SIMVASTATIN 40 MG TABLET: 1 TABLET EVERY EVENING ORALLY ONCE A DAY 90 DAYS

## 2022-07-04 LAB
GLUCOSE BLDC GLUCOMTR-MCNC: 106 MG/DL — HIGH (ref 70–99)
GLUCOSE BLDC GLUCOMTR-MCNC: 173 MG/DL — HIGH (ref 70–99)

## 2022-07-04 PROCEDURE — 99232 SBSQ HOSP IP/OBS MODERATE 35: CPT

## 2022-07-04 RX ORDER — POLYETHYLENE GLYCOL 3350 17 G/17G
17 POWDER, FOR SOLUTION ORAL DAILY
Refills: 0 | Status: DISCONTINUED | OUTPATIENT
Start: 2022-07-04 | End: 2022-07-07

## 2022-07-04 RX ADMIN — LIDOCAINE 1 PATCH: 4 CREAM TOPICAL at 23:56

## 2022-07-04 RX ADMIN — Medication 1 ENEMA: at 17:30

## 2022-07-04 RX ADMIN — Medication 10 MILLIGRAM(S): at 14:56

## 2022-07-04 RX ADMIN — POLYETHYLENE GLYCOL 3350 17 GRAM(S): 17 POWDER, FOR SOLUTION ORAL at 14:56

## 2022-07-04 RX ADMIN — HEPARIN SODIUM 5000 UNIT(S): 5000 INJECTION INTRAVENOUS; SUBCUTANEOUS at 05:23

## 2022-07-04 RX ADMIN — LIDOCAINE 1 PATCH: 4 CREAM TOPICAL at 11:28

## 2022-07-04 RX ADMIN — LIDOCAINE 1 PATCH: 4 CREAM TOPICAL at 19:52

## 2022-07-04 RX ADMIN — HEPARIN SODIUM 5000 UNIT(S): 5000 INJECTION INTRAVENOUS; SUBCUTANEOUS at 17:16

## 2022-07-04 NOTE — PROGRESS NOTE ADULT - PROBLEM SELECTOR PLAN 4
New diagnosis; CT chest significant for innumerable bilateral pulmonary nodules concerning for metastatic disease. A right breast soft tissue lesion measures up to 3.1 cm, concerning for primary malignancy. Moderate left pleural effusion. Retroperitoneal lymphadenopathy. Suspect breast cancer, now with possible metastases to lung given multiple pulmonary nodules.    - Extensively discussed with patient, son (Keyon) and Granddaughter (Yumiko). Patient AOx3 at time of encounter, communicated findings with patient, patient states she doesn't want to pursue additional workup at this time, she states she may consider in the future as outpatient. Discussed patient's wishes with Son and granddaughter. Son (HCP) respectful of patient's decision. Provided [facility] number to granddaughter and plan to include on discharge if they are interested in pursuing outpatient biopsy can perform at [facility] per IR by calling [phone]. Consideration of outpatient Oncology evaluation if patient changes her mind in the future

## 2022-07-04 NOTE — PROGRESS NOTE ADULT - NSPROGADDITIONALINFOA_GEN_ALL_CORE
Updated granddaughter Yumiko today, attempted to update son Keyon but no answer. Granddaughter states she will update Keyon. Patient and family agreeable to rehab. Awaiting placement.
Son Keyon updated 7/1, Granddaughter Yumiko updated 7/2. Updated regarding plan of care, all questions answered. Patient and family agreeable to rehab, awaiting placement.
Awaiting dispo to rehab  Son Keyon updated 7/1, Granddaughter Yumiko updated 7/2. Updated regarding plan of care, all questions answered. Patient and family agreeable to rehab, awaiting placement.
Conference call held with Son Keyon (HCP) and Granddaughter Yumiko today. Updated regarding patient's preferences regarding additional workup of breast mass (as documented above).     Dispo planning in process. PT recommending rehab. Patient interested in home with services. Patient revisited with  today, CM provided information regarding home services although would have to be private hire, not covered by insurance. Home without services is not a safe discharge option given patient's age, comorbidities, and presentation here after fall without being found for several days. Rehab recommended to patient. Family also agreeable to rehab and to discuss with patient.
Granddaughter Yumiko updated 6/30/22

## 2022-07-05 DIAGNOSIS — I95.1 ORTHOSTATIC HYPOTENSION: ICD-10-CM

## 2022-07-05 LAB
GLUCOSE BLDC GLUCOMTR-MCNC: 111 MG/DL — HIGH (ref 70–99)
GLUCOSE BLDC GLUCOMTR-MCNC: 118 MG/DL — HIGH (ref 70–99)
GLUCOSE BLDC GLUCOMTR-MCNC: 127 MG/DL — HIGH (ref 70–99)
GLUCOSE BLDC GLUCOMTR-MCNC: 131 MG/DL — HIGH (ref 70–99)
GLUCOSE BLDC GLUCOMTR-MCNC: 93 MG/DL — SIGNIFICANT CHANGE UP (ref 70–99)

## 2022-07-05 PROCEDURE — 99233 SBSQ HOSP IP/OBS HIGH 50: CPT

## 2022-07-05 RX ORDER — SODIUM CHLORIDE 9 MG/ML
1000 INJECTION INTRAMUSCULAR; INTRAVENOUS; SUBCUTANEOUS
Refills: 0 | Status: DISCONTINUED | OUTPATIENT
Start: 2022-07-05 | End: 2022-07-06

## 2022-07-05 RX ORDER — SODIUM CHLORIDE 9 MG/ML
1000 INJECTION INTRAMUSCULAR; INTRAVENOUS; SUBCUTANEOUS ONCE
Refills: 0 | Status: COMPLETED | OUTPATIENT
Start: 2022-07-05 | End: 2022-07-05

## 2022-07-05 RX ADMIN — SODIUM CHLORIDE 1000 MILLILITER(S): 9 INJECTION INTRAMUSCULAR; INTRAVENOUS; SUBCUTANEOUS at 11:47

## 2022-07-05 RX ADMIN — SODIUM CHLORIDE 75 MILLILITER(S): 9 INJECTION INTRAMUSCULAR; INTRAVENOUS; SUBCUTANEOUS at 15:30

## 2022-07-05 RX ADMIN — HEPARIN SODIUM 5000 UNIT(S): 5000 INJECTION INTRAVENOUS; SUBCUTANEOUS at 17:33

## 2022-07-05 RX ADMIN — LIDOCAINE 1 PATCH: 4 CREAM TOPICAL at 11:47

## 2022-07-05 RX ADMIN — LIDOCAINE 1 PATCH: 4 CREAM TOPICAL at 23:19

## 2022-07-05 RX ADMIN — LIDOCAINE 1 PATCH: 4 CREAM TOPICAL at 19:45

## 2022-07-05 RX ADMIN — HEPARIN SODIUM 5000 UNIT(S): 5000 INJECTION INTRAVENOUS; SUBCUTANEOUS at 05:38

## 2022-07-05 NOTE — SWALLOW BEDSIDE ASSESSMENT ADULT - COMMENTS
As per H&P "89 y/o female with MHx HTN, DM Type 2, HLD who presents to the ED after a fall. The patient is poor historian. Called granddaughter (listed in handoff). She reports the patient had fallen, likely 2 or 3 days ago. Family was unable to reach her so they called the fire department to break into the patient's home. The patient was found on the ground in her bathroom, moaning. The patient lives alone and has had all ADLs and IADLs intact, per granddaughter. Most of her care has been in [place] with PCP Dr. Cervantes. Granddaughter does not know patient's medications. The patient reports mild lower back pain, wore with movement, with no associated incontinence. She says that does not recall the event but reports no Abd pain, n/v/d, fever, cough, SOB, CP or palpitations. Also reports no HA and sore throat. She would like some tea to drink."    WBC is elevated. Patient with CTH revealing "No acute intracranial hemorrhage, mass effect, or midline shift.". CT Chest revealed Innumerable bilateral pulmonary nodules concerning for metastatic disease. A right breast soft tissue lesion measures up to 3.1 cm, concerning for primary malignancy."    Patient seen for bedside swallow assessment in the [facility]. Received upright in stretcher, denied pain. Patient unable to report baseline diet able to make basic wants and needs known and follow 1-step directions.
Per Hospitalist Note 7/4/22: "88 y/o female with MHx HTN, DM Type 2, HLD who presents after a fall a/w likely acute UTI failure to thrive c/b severe dehydration, hypoalbuminemia and cachexia; Found to have Rt breast mass with associated likely pulmonary metastases and possibly malignant pleural effusion. Patient not interested in further workup or management while inpatient, now awaiting rehab placement."     Patient is familiar to this department as the patient was seen for an initial clinical swallow evaluation on 6/29/22 with recommendation of puree with thin liquids. refer to consult for further details.     Patient received upright in bedside chair, awake, alert and communicative. Patient denies globus sensation and odynophagia, though reports occasional reflux.

## 2022-07-05 NOTE — SWALLOW BEDSIDE ASSESSMENT ADULT - ASR SWALLOW RECOMMEND DIAG
Cinesophagram NOT indicated given throat clear for regular solids ONLY
objective testing is NOT recommended given no overt s/sx of impaired airway protection and WBC WFL

## 2022-07-05 NOTE — SWALLOW BEDSIDE ASSESSMENT ADULT - ADDITIONAL RECOMMENDATIONS
monitor for diet tolerance and reconsult this department as indicated. 1.monitor for diet tolerance and reconsult this department as indicated. 2. consider GI consult due to patient reports of reflux.

## 2022-07-05 NOTE — PROGRESS NOTE ADULT - PROBLEM SELECTOR PLAN 10
DVT ppx: Heparin sq bid  Diet: Pureed diet with thin liquids - as per S&S evaluation, repeat eval today for advancement   Code status: full code DVT ppx: Heparin sq bid  Diet: Pureed diet with thin liquids - as per S&S evaluation, repeat eval today for advancement   Code status: full code    Updated patient granddaughter Yumiko Mandel 7/5: informed her that advanced diet per S&S, hydrating today given + orthostatics. Informed her that we would follow her PO intake and her orthostatics tomorrow. DVT ppx: Heparin sq bid  Diet: Pureed diet with thin liquids - as per S&S evaluation, repeat eval today for advancement   Code status: full code    Updated patient granddaughter Yumiko Mandel 7/5: informed her that advanced diet per S&S, hydrating today given + orthostatics. Informed her that we would follow her PO intake and her orthostatics tomorrow.    Updated patient son Keyon regarding above events 7/5

## 2022-07-05 NOTE — PROGRESS NOTE ADULT - PROBLEM SELECTOR PLAN 4
Cachectic on exam. Hypoalbuminemia, severe dehydration, gen weakness, suspected metastatic malignancy  - Nutrition consult requested, continue to encourage PO intake

## 2022-07-05 NOTE — PROGRESS NOTE ADULT - PROBLEM SELECTOR PLAN 9
TSH not detectable. T3 low, T4 nl, subclinical hyperthyroidism  - Repeat thyroid studies as outpatient

## 2022-07-06 LAB
GLUCOSE BLDC GLUCOMTR-MCNC: 108 MG/DL — HIGH (ref 70–99)
GLUCOSE BLDC GLUCOMTR-MCNC: 112 MG/DL — HIGH (ref 70–99)
GLUCOSE BLDC GLUCOMTR-MCNC: 132 MG/DL — HIGH (ref 70–99)
GLUCOSE BLDC GLUCOMTR-MCNC: 132 MG/DL — HIGH (ref 70–99)
GLUCOSE BLDC GLUCOMTR-MCNC: 150 MG/DL — HIGH (ref 70–99)
GLUCOSE BLDC GLUCOMTR-MCNC: 167 MG/DL — HIGH (ref 70–99)
SARS-COV-2 RNA SPEC QL NAA+PROBE: SIGNIFICANT CHANGE UP

## 2022-07-06 PROCEDURE — 99232 SBSQ HOSP IP/OBS MODERATE 35: CPT

## 2022-07-06 RX ADMIN — HEPARIN SODIUM 5000 UNIT(S): 5000 INJECTION INTRAVENOUS; SUBCUTANEOUS at 17:05

## 2022-07-06 RX ADMIN — SODIUM CHLORIDE 75 MILLILITER(S): 9 INJECTION INTRAMUSCULAR; INTRAVENOUS; SUBCUTANEOUS at 06:20

## 2022-07-06 RX ADMIN — HEPARIN SODIUM 5000 UNIT(S): 5000 INJECTION INTRAVENOUS; SUBCUTANEOUS at 06:20

## 2022-07-06 NOTE — PROGRESS NOTE ADULT - PROBLEM SELECTOR PLAN 10
DVT ppx: Heparin sq bid  Diet: Regular diet ensure three times a day   Code status: full code    Updated patient granddaughter Yumiko Mandel 7/5: informed her that advanced diet per S&S, hydrating today given + orthostatics. Informed her that we would follow her PO intake and her orthostatics tomorrow.    Updated patient son Keyon regarding above events 7/5 DVT ppx: Heparin sq bid  Diet: Regular diet ensure three times a day   Code status: full code    Updated patient granddaughter Yumiko Mandel 7/5: informed her that advanced diet per S&S, hydrating today given + orthostatics. Informed her that we would follow her PO intake and her orthostatics tomorrow.    Updated patient son Keyon regarding above events 7/5  Updated patient granddaughter Saturnino 7/6: Plan to monitor patient for 24 hours off IVF. Repeat orthostatics 7/7. If negative and patient eating well plan to medically clear for rehab.

## 2022-07-07 ENCOUNTER — TRANSCRIPTION ENCOUNTER (OUTPATIENT)
Age: 87
End: 2022-07-07

## 2022-07-07 VITALS
HEART RATE: 89 BPM | TEMPERATURE: 98 F | SYSTOLIC BLOOD PRESSURE: 120 MMHG | DIASTOLIC BLOOD PRESSURE: 89 MMHG | RESPIRATION RATE: 17 BRPM | OXYGEN SATURATION: 98 %

## 2022-07-07 LAB
GLUCOSE BLDC GLUCOMTR-MCNC: 118 MG/DL — HIGH (ref 70–99)
GLUCOSE BLDC GLUCOMTR-MCNC: 119 MG/DL — HIGH (ref 70–99)
GLUCOSE BLDC GLUCOMTR-MCNC: 163 MG/DL — HIGH (ref 70–99)
GLUCOSE BLDC GLUCOMTR-MCNC: 163 MG/DL — HIGH (ref 70–99)

## 2022-07-07 PROCEDURE — 99239 HOSP IP/OBS DSCHRG MGMT >30: CPT

## 2022-07-07 RX ORDER — PIOGLITAZONE HCL AND METFORMIN HCL 850; 15 MG/1; MG/1
1 TABLET ORAL
Qty: 0 | Refills: 0 | DISCHARGE

## 2022-07-07 RX ORDER — LIDOCAINE 4 G/100G
1 CREAM TOPICAL
Qty: 0 | Refills: 0 | DISCHARGE
Start: 2022-07-07

## 2022-07-07 RX ORDER — AMLODIPINE BESYLATE 2.5 MG/1
1 TABLET ORAL
Qty: 0 | Refills: 0 | DISCHARGE

## 2022-07-07 RX ORDER — ACETAMINOPHEN 500 MG
975 TABLET ORAL EVERY 8 HOURS
Refills: 0 | Status: DISCONTINUED | OUTPATIENT
Start: 2022-07-07 | End: 2022-07-07

## 2022-07-07 RX ADMIN — LIDOCAINE 1 PATCH: 4 CREAM TOPICAL at 17:36

## 2022-07-07 RX ADMIN — HEPARIN SODIUM 5000 UNIT(S): 5000 INJECTION INTRAVENOUS; SUBCUTANEOUS at 17:33

## 2022-07-07 RX ADMIN — Medication 650 MILLIGRAM(S): at 11:27

## 2022-07-07 RX ADMIN — Medication 650 MILLIGRAM(S): at 10:45

## 2022-07-07 RX ADMIN — HEPARIN SODIUM 5000 UNIT(S): 5000 INJECTION INTRAVENOUS; SUBCUTANEOUS at 05:14

## 2022-07-07 RX ADMIN — LIDOCAINE 1 PATCH: 4 CREAM TOPICAL at 10:46

## 2022-07-07 NOTE — PROGRESS NOTE ADULT - PROBLEM SELECTOR PROBLEM 4
Breast mass
Adult failure to thrive
Breast mass
Breast mass
Adult failure to thrive
Adult failure to thrive
Breast mass

## 2022-07-07 NOTE — PROGRESS NOTE ADULT - PROBLEM SELECTOR PROBLEM 2
Dehydration
Orthostatic hypotension
Dehydration
Orthostatic hypotension
Orthostatic hypotension
Dehydration

## 2022-07-07 NOTE — PROGRESS NOTE ADULT - PROBLEM SELECTOR PLAN 5
CT Cervical, Thoracic, and Lumbar Spine: Age-indeterminate fracture of the right L1 transverse process.  - ordered lidocaine patch  - tylenol PRN  - Spine team to be consulted
New diagnosis; CT chest significant for innumerable bilateral pulmonary nodules concerning for metastatic disease. A right breast soft tissue lesion measures up to 3.1 cm, concerning for primary malignancy. Moderate left pleural effusion. Retroperitoneal lymphadenopathy. Suspect breast cancer, now with possible metastases to lung given multiple pulmonary nodules.    - Extensively discussed with patient, son (Keyon) and Granddaughter (Yumiko). Patient AOx3 at time of encounter, communicated findings with patient, patient states she doesn't want to pursue additional workup at this time, she states she may consider in the future as outpatient. Discussed patient's wishes with Son and granddaughter. Son (HCP) respectful of patient's decision. Provided [facility] number to granddaughter and plan to include on discharge if they are interested in pursuing outpatient biopsy can perform at [facility] per IR by calling [phone]. Consideration of outpatient Oncology evaluation if patient changes her mind in the future
New diagnosis; CT chest significant for innumerable bilateral pulmonary nodules concerning for metastatic disease. A right breast soft tissue lesion measures up to 3.1 cm, concerning for primary malignancy. Moderate left pleural effusion. Retroperitoneal lymphadenopathy. Suspect breast cancer, now with possible metastases to lung given multiple pulmonary nodules.    - Extensively discussed with patient, son (Keyon) and Granddaughter (Yumiko). Patient AOx3 at time of encounter, communicated findings with patient, patient states she doesn't want to pursue additional workup at this time, she states she may consider in the future as outpatient. Discussed patient's wishes with Son and granddaughter. Son (HCP) respectful of patient's decision. Provided [facility] number to granddaughter and plan to include on discharge if they are interested in pursuing outpatient biopsy can perform at [facility] per IR by calling [phone]. Consideration of outpatient Oncology evaluation if patient changes her mind in the future
CT Cervical, Thoracic, and Lumbar Spine: Age-indeterminate fracture of the right L1 transverse process.  - lidocaine patch PRN  - tylenol PRN  - Spine surgery consulted, no NSG intervention advised, no brace required  - PT eval appreciated
CT Cervical, Thoracic, and Lumbar Spine: Age-indeterminate fracture of the right L1 transverse process.  - lidocaine patch PRN  - tylenol PRN  - Spine surgery consulted, no NSG intervention advised, no brace required  - PT eval appreciated
CT Cervical, Thoracic, and Lumbar Spine: Age-indeterminate fracture of the right L1 transverse process.  - lidocaine patch PRN  - tylenol PRN  - Spine surgery consulted, no NSG intervention advised, no brace required  - PT eval appreciated, recommending rehab
New diagnosis; CT chest significant for innumerable bilateral pulmonary nodules concerning for metastatic disease. A right breast soft tissue lesion measures up to 3.1 cm, concerning for primary malignancy. Moderate left pleural effusion. Retroperitoneal lymphadenopathy. Suspect breast cancer, now with possible metastases to lung given multiple pulmonary nodules.    - Extensively discussed with patient, son (Keyon) and Granddaughter (Yumiko). Patient AOx3 at time of encounter, communicated findings with patient, patient states she doesn't want to pursue additional workup at this time, she states she may consider in the future as outpatient. Discussed patient's wishes with Son and granddaughter. Son (HCP) respectful of patient's decision. Provided [facility] number to granddaughter and plan to include on discharge if they are interested in pursuing outpatient biopsy can perform at [facility] per IR by calling [phone]. Consideration of outpatient Oncology evaluation if patient changes her mind in the future
CT Cervical, Thoracic, and Lumbar Spine: Age-indeterminate fracture of the right L1 transverse process.  - lidocaine patch PRN  - tylenol PRN  - Spine surgery consulted, no NSG intervention advised, no brace required  - PT eval appreciated, recommending rehab
CT Cervical, Thoracic, and Lumbar Spine: Age-indeterminate fracture of the right L1 transverse process.  - lidocaine patch PRN  - tylenol PRN  - Spine surgery consulted, no NSG intervention advised, no brace required  - PT eval appreciated, recommending rehab

## 2022-07-07 NOTE — PROGRESS NOTE ADULT - PROBLEM SELECTOR PROBLEM 9
Abnormal TSH
Prophylactic measure
Abnormal TSH
Abnormal TSH

## 2022-07-07 NOTE — PROGRESS NOTE ADULT - NUTRITIONAL ASSESSMENT
This patient has been assessed with a concern for Malnutrition and has been determined to have a diagnosis/diagnoses of Severe protein-calorie malnutrition and Underweight (BMI < 19).    This patient is being managed with:   Diet Pureed-  Entered: Jun 30 2022  4:48PM    
This patient has been assessed with a concern for Malnutrition and has been determined to have a diagnosis/diagnoses of Severe protein-calorie malnutrition and Underweight (BMI < 19).    This patient is being managed with:   Diet Pureed-  Entered: Jun 30 2022  4:48PM    
This patient has been assessed with a concern for Malnutrition and has been determined to have a diagnosis/diagnoses of Severe protein-calorie malnutrition and Underweight (BMI < 19).    This patient is being managed with:   Diet Pureed-  Supplement Feeding Modality:  Oral  Ensure Enlive Cans or Servings Per Day:  1       Frequency:  Three Times a day  Entered: Jul 2 2022  2:34PM    
This patient has been assessed with a concern for Malnutrition and has been determined to have a diagnosis/diagnoses of Severe protein-calorie malnutrition and Underweight (BMI < 19).    This patient is being managed with:   Diet Pureed-  Supplement Feeding Modality:  Oral  Ensure Enlive Cans or Servings Per Day:  1       Frequency:  Three Times a day  Entered: Jul 2 2022  2:34PM    
This patient has been assessed with a concern for Malnutrition and has been determined to have a diagnosis/diagnoses of Severe protein-calorie malnutrition and Underweight (BMI < 19).    This patient is being managed with:   Diet Regular-  Supplement Feeding Modality:  Oral  Ensure Enlive Cans or Servings Per Day:  1       Frequency:  Three Times a day  Entered: Jul 5 2022 12:11PM    

## 2022-07-07 NOTE — DISCHARGE NOTE NURSING/CASE MANAGEMENT/SOCIAL WORK - NSDCPEFALRISK_GEN_ALL_CORE
For information on Fall & Injury Prevention, visit: [email or web address] OR  [email or web address] OR  https://www.cdc.gov/steadi/patient.html

## 2022-07-07 NOTE — PROGRESS NOTE ADULT - PROBLEM SELECTOR PLAN 8
TSH not detectable. T3 low, T4 nl   - Repeat thyroid studies as outpatient
TSH not detectable. Pending T3 and T4.   - F/u thyroid studies.
TSH not detectable. T3 low, T4 nl   - Repeat thyroid studies as outpatient
TSH not detectable. T3 low, T4 nl   - Repeat thyroid studies as outpatient
Hg 9-10s, no signs of bleeding  - Continue to monitor
Hg 9-10s, no signs of bleeding  - Continue to monitor
TSH not detectable. T3 low, T4 nl   - Repeat thyroid studies as outpatient
Hg 9-10s, no signs of bleeding  - Continue to monitor
TSH not detectable. T3 low, T4 nl   - Repeat thyroid studies as outpatient

## 2022-07-07 NOTE — PROGRESS NOTE ADULT - PROBLEM SELECTOR PROBLEM 5
Fracture of transverse process of lumbar vertebra
Breast mass
Fracture of transverse process of lumbar vertebra
Breast mass
Breast mass
Fracture of transverse process of lumbar vertebra

## 2022-07-07 NOTE — PROGRESS NOTE ADULT - PROBLEM SELECTOR PLAN 3
- currently appears dehydrated on exam   - 1L NS bolus followed by NS IVF 75cc/hr today   - repeat orthostatics 7/6 AM
- now appears euvolemic s/p IVF 7/5   - encourage PO
Cachectic on exam. Hypoalbuminemia, severe dehydration, gen weakness, suspected metastatic malignancy  - Nutrition consult requested, continue to encourage PO intake
Cachectic on exam. Hypoalbuminemia, severe dehydration, gen weakness, suspected metastatic malignancy  - Nutrition consult requested
Cachectic on exam. Hypoalbuminemia, severe dehydration, gen weakness, suspected metastatic malignancy  - Nutrition consult requested, continue to encourage PO intake
- now appears euvolemic s/p IVF 7/5   - encourage PO
Cachectic on exam. Hypoalbuminemia down to 2.4, severe dehydration, gen weakness, likely metastatic malignancy;   - Nutrition consult requested  - Hold simvastatin
Cachectic on exam. Hypoalbuminemia, severe dehydration, gen weakness, suspected metastatic malignancy  - Nutrition consult requested, continue to encourage PO intake
Cachectic on exam. Hypoalbuminemia, severe dehydration, gen weakness, suspected metastatic malignancy  - Nutrition consult requested

## 2022-07-07 NOTE — PROGRESS NOTE ADULT - PROBLEM SELECTOR PROBLEM 6
Generalized weakness
Fracture of transverse process of lumbar vertebra
Fracture of transverse process of lumbar vertebra
Generalized weakness
Generalized weakness
Fracture of transverse process of lumbar vertebra
Generalized weakness

## 2022-07-07 NOTE — PROGRESS NOTE ADULT - ASSESSMENT
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   
88 y/o female with MHx HTN, DM Type 2, HLD who presents after a fall a/w likely acute UTI failure to thrive c/b severe dehydration, hypoalbuminemia and cachexia; Found to have Rt breast mass with associated likely pulmonary metastases and possibly malignant pleural effusion. Patient not interested in further workup or management while inpatient, now awaiting rehab placement. 
89 y/o female with MHx HTN, DM Type 2, HLD who presents after a fall a/w likely acute UTI failure to thrive c/b severe dehydration, hypoalbuminemia and cachexia; Found to have Rt breast mass with associated likely pulmonary metastases and possibly malignant pleural effusion. 
88 y/o female with MHx HTN, DM Type 2, HLD who presents after a fall a/w likely acute UTI failure to thrive c/b severe dehydration, hypoalbuminemia and cachexia; Found to have Rt breast mass with associated likely pulmonary metastases and possibly malignant pleural effusion. Patient not interested in further workup or management while inpatient, now awaiting rehab placement. 
89 y/o female with MHx HTN, DM Type 2, HLD who presents after a fall a/w likely acute UTI failure to thrive c/b severe dehydration, hypoalbuminemia and cachexia; Found to have Rt breast mass with associated likely pulmonary metastases and likely malignant pleural effusion;   
89 y/o female with MHx HTN, DM Type 2, HLD who presents after a fall a/w likely acute UTI failure to thrive c/b severe dehydration, hypoalbuminemia and cachexia; Found to have Rt breast mass with associated likely pulmonary metastases and possibly malignant pleural effusion. Patient not interested in further workup or management while inpatient, now awaiting rehab placement. 
89 y/o female with MHx HTN, DM Type 2, HLD who presents after a fall a/w likely acute UTI failure to thrive c/b severe dehydration, hypoalbuminemia and cachexia; Found to have Rt breast mass with associated likely pulmonary metastases and possibly malignant pleural effusion. Patient not interested in further workup or management while inpatient, now awaiting rehab placement. 
89 y/o female with MHx HTN, DM Type 2, HLD who presents after a fall a/w likely acute UTI failure to thrive c/b severe dehydration, hypoalbuminemia and cachexia; Found to have Rt breast mass with associated likely pulmonary metastases and likely malignant pleural effusion. 
89 y/o female with MHx HTN, DM Type 2, HLD who presents after a fall a/w likely acute UTI failure to thrive c/b severe dehydration, hypoalbuminemia and cachexia; Found to have Rt breast mass with associated likely pulmonary metastases and possibly malignant pleural effusion. Patient not interested in further workup or management while inpatient, now awaiting rehab placement.

## 2022-07-07 NOTE — PROGRESS NOTE ADULT - PROBLEM SELECTOR PLAN 10
DVT ppx: Heparin sq bid  Diet: Regular diet ensure three times a day   Code status: full code    Updated patient granddaughter Yumiko Mandel 7/5: informed her that advanced diet per S&S, hydrating today given + orthostatics. Informed her that we would follow her PO intake and her orthostatics tomorrow.    Updated patient son Keyon regarding above events 7/5  Updated patient granddaughter Saturnino 7/6: Plan to monitor patient for 24 hours off IVF. Repeat orthostatics 7/7. If negative and patient eating well plan to medically clear for rehab.  Updated patient granddaughter Irlanda and son Keyon 7/7, patient remains w/ negative orthostatics w/o IVF >24 hours. Plan for discharge to rehab today.     >38 minutes spent on d'c planning DVT ppx: Heparin sq bid  Diet: Regular diet ensure three times a day   Code status: full code    Updated patient granddajerman Mandel 7/5: informed her that advanced diet per S&S, hydrating today given + orthostatics. Informed her that we would follow her PO intake and her orthostatics tomorrow.    Updated patient son Keyon regarding above events 7/5  Updated patient granddaughter Amy 7/6: Plan to monitor patient for 24 hours off IVF. Repeat orthostatics 7/7. If negative and patient eating well plan to medically clear for rehab.  Updated patient granddaughter Yumiko and son Keyon 7/7, patient remains w/ negative orthostatics w/o IVF >24 hours. Plan for discharge to rehab today.     >38 minutes spent on d'c planning

## 2022-07-07 NOTE — PROGRESS NOTE ADULT - PROBLEM SELECTOR PLAN 6
i/s/o likely malignancy with mets and FTT; Likely exacerbated by dehydration; No evidence of rhabdo, CPK wnl  CT chest significant for trace mucoid secretions in the trachea - c/f potential dysphagia/ aspiration   - PT consult  - Formal speech and swallow ordered - pureed and thin liquids diet
CT Cervical, Thoracic, and Lumbar Spine: Age-indeterminate fracture of the right L1 transverse process.  - lidocaine patch PRN  - tylenol PRN  - Spine surgery consulted, no NSG intervention advised, no brace required  - PT eval appreciated, recommending rehab
CT Cervical, Thoracic, and Lumbar Spine: Age-indeterminate fracture of the right L1 transverse process.  - lidocaine patch PRN  - tylenol PRN  - Spine surgery consulted, no NSG intervention advised, no brace required  - PT eval appreciated, recommending rehab
i/s/o likely malignancy with mets and FTT; Likely exacerbated by dehydration; No evidence of rhabdo, CPK wnl  CT chest significant for trace mucoid secretions in the trachea - c/f potential dysphagia/ aspiration   - PT consult  - Formal speech and swallow ordered, recommending pureed and thin liquids diet
i/s/o likely malignancy with mets and FTT; Likely exacerbated by dehydration; No evidence of rhabdo, CPK wnl  CT chest significant for trace mucoid secretions in the trachea - c/f potential dysphagia/ aspiration   - PT consult recommending rehab  - Formal speech and swallow ordered, recommending pureed and thin liquids diet
CT Cervical, Thoracic, and Lumbar Spine: Age-indeterminate fracture of the right L1 transverse process.  - lidocaine patch PRN  - tylenol PRN  - Spine surgery consulted, no NSG intervention advised, no brace required  - PT eval appreciated, recommending rehab

## 2022-07-07 NOTE — PROGRESS NOTE ADULT - PROBLEM SELECTOR PROBLEM 3
Dehydration
Adult failure to thrive
Adult failure to thrive
Dehydration
Adult failure to thrive
Dehydration
Adult failure to thrive

## 2022-07-07 NOTE — PROGRESS NOTE ADULT - PROBLEM SELECTOR PLAN 7
Hemoglobin 10 on admission.   - granddaughter thinks patient may have had blood or iron infusions in the past, unknown  - Pending repeat CBC.
Hg 9-10s, no signs of bleeding  - Continue to monitor closely
i/s/o likely malignancy with mets and FTT; Likely exacerbated by dehydration; No evidence of rhabdo, CPK wnl  CT chest significant for trace mucoid secretions in the trachea - c/f potential dysphagia/ aspiration   - PT consult recommending rehab  - Formal speech and swallow ordered, recommending pureed and thin liquids diet, repeat eval today to assess for advancement
Hg 9-10s, no signs of bleeding  - Continue to monitor closely
i/s/o likely malignancy with mets and FTT; Likely exacerbated by dehydration; No evidence of rhabdo, CPK wnl  CT chest significant for trace mucoid secretions in the trachea - c/f potential dysphagia/ aspiration   - PT consult recommending rehab  - Formal speech and swallow ordered, initially recommended pureed and thin liquids diet --> repeat eval 7/5 advanced diet to regular
i/s/o likely malignancy with mets and FTT; Likely exacerbated by dehydration; No evidence of rhabdo, CPK wnl  CT chest significant for trace mucoid secretions in the trachea - c/f potential dysphagia/ aspiration   - PT consult recommending rehab  - Formal speech and swallow ordered, initially recommended pureed and thin liquids diet --> repeat eval 7/5 advanced diet to regular

## 2022-07-07 NOTE — PROGRESS NOTE ADULT - PROBLEM SELECTOR PLAN 1
UA +LE and WBCs. Given Leukocytosis and FTT with cachexia will treat empirically; Pt not a reliable historian  - Ceftriaxone IV, completed antibiotics course  - UCx w/ e. coli, sens to CTX
UA +LE and WBCs. Given Leukocytosis and FTT with cachexia treated empirically; Pt not a reliable historian  - Ceftriaxone IV, completed antibiotics course  - UCx w/ e. coli, sens to CTX
UA +LE and WBCs. Given Leukocytosis and FTT with cachexia will treat empirically; Pt not a reliable historian  - Ceftriaxone IV, completed antibiotics course  - UCx w/ e. coli, sens to CTX
UA +LE and WBCs. Given Leukocytosis 12.4K and FTT with cachexia will treat empirically; Pt not a reliable historian;   - Ceftriaxone IV for 3 day course  - f/u UCx  - Monitor for sepsis and fever
UA +LE and WBCs. Given Leukocytosis and FTT with cachexia will treat empirically; Pt not a reliable historian  - Ceftriaxone IV for 3 day course  - UCx w/ e. coli
UA +LE and WBCs. Given Leukocytosis and FTT with cachexia will treat empirically; Pt not a reliable historian  - Ceftriaxone IV, completed antibiotics course  - UCx w/ e. coli, sens to CTX
UA +LE and WBCs. Given Leukocytosis and FTT with cachexia will treat empirically; Pt not a reliable historian  - Ceftriaxone IV, completed antibiotics course  - UCx w/ e. coli, sens to CTX
UA +LE and WBCs. Given Leukocytosis and FTT with cachexia treated empirically; Pt not a reliable historian  - Ceftriaxone IV, completed antibiotics course  - UCx w/ e. coli, sens to CTX
UA +LE and WBCs. Given Leukocytosis and FTT with cachexia will treat empirically; Pt not a reliable historian  - Ceftriaxone IV, completing course today  - UCx w/ e. coli, sens to CTX

## 2022-07-07 NOTE — PROGRESS NOTE ADULT - PROBLEM SELECTOR PROBLEM 7
Microcytic anemia
Generalized weakness
Generalized weakness
Microcytic anemia
Microcytic anemia
Generalized weakness
Microcytic anemia

## 2022-07-07 NOTE — PROGRESS NOTE ADULT - SUBJECTIVE AND OBJECTIVE BOX
Hospitalist Progress Note  Tawanna Thakkar MD Pager # [phone]    OVERNIGHT EVENTS: JOSE    SUBJECTIVE / INTERVAL HPI: Patient seen and examined at bedside. Pt. reports mid-spinal back pain. She does report weight loss over some time. Occasional difficulty breathing. Currently, no SOB or coughing. No fevers/chills. She also reports LE weakness.     VITAL SIGNS:  Vital Signs Last 24 Hrs  T(C): 36.5 (2022 06:26), Max: 36.5 (2022 19:44)  T(F): 97.7 (2022 06:26), Max: 97.7 (2022 19:44)  HR: 88 (2022 06:26) (65 - 88)  BP: 126/56 (2022 06:26) (114/50 - 141/59)  BP(mean): --  RR: 17 (2022 06:26) (16 - 18)  SpO2: 100% (2022 06:26) (97% - 100%)    PHYSICAL EXAM:    General: Cachetic appearing female resting in bed   HEENT: NC/AT; PERRL, anicteric sclera; MMM  Neck: supple  Cardiovascular: +S1/S2; RRR  Respiratory: Left lung with decreased breath sounds - right light clear to auscultation. No wheezing. No respiratory distress/accessory muscle use.   Gastrointestinal: soft, NT/ND; +BSx4  Extremities: WWP; no edema, clubbing or cyanosis  Back: thoracic spine point tenderness to palpation   Vascular: 2+ radial, DP/PT pulses B/L  Neurological: AAOx3; pt has decreased bilateral strength in the lower extremities but has difficulty participating in the strength exam 2/2 pain.     MEDICATIONS:  MEDICATIONS  (STANDING):  cefTRIAXone   IVPB 1000 milliGRAM(s) IV Intermittent every 24 hours  heparin SubCutaneous Injection - Peds 5000 Unit(s) SubCutaneous every 12 hours  lidocaine   4% Patch 1 Patch Transdermal daily  sodium chloride 0.9%. 1000 milliLiter(s) (75 mL/Hr) IV Continuous <Continuous>    MEDICATIONS  (PRN):  acetaminophen     Tablet .. 650 milliGRAM(s) Oral every 8 hours PRN Mild Pain (1 - 3)  aluminum hydroxide/magnesium hydroxide/simethicone Suspension 30 milliLiter(s) Oral every 6 hours PRN Upset Stomach  melatonin 3 milliGRAM(s) Oral at bedtime PRN Insomnia      ALLERGIES:  Allergies    No Known Allergies    Intolerances        LABS:                        9.2    11.38 )-----------( 373      ( 2022 13:09 )             32.4         140  |  101  |  55<H>  ----------------------------<  124<H>  4.2   |  17<L>  |  0.83    Ca    10.3      2022 17:15    TPro  4.6<L>  /  Alb  2.4<L>  /  TBili  0.4  /  DBili  x   /  AST  51<H>  /  ALT  39<H>  /  AlkPhos  119        Urinalysis Basic - ( 2022 17:45 )    Color: Yellow / Appearance: Slightly Turbid / S.025 / pH: x  Gluc: x / Ketone: Small  / Bili: Negative / Urobili: 3 mg/dL   Blood: x / Protein: Trace / Nitrite: Negative   Leuk Esterase: Large / RBC: 14 /HPF / WBC 20 /HPF   Sq Epi: x / Non Sq Epi: 9 /HPF / Bacteria: Negative      CAPILLARY BLOOD GLUCOSE      POCT Blood Glucose.: 131 mg/dL (2022 15:32)      RADIOLOGY & ADDITIONAL TESTS: Reviewed.    ASSESSMENT:    PLAN: 
Patient is a 89y old  Female who presents with a chief complaint of Fall (03 Jul 2022 13:04)    SUBJECTIVE / OVERNIGHT EVENTS: Patient w/ some back pain this morning. Giving Tylenol. Patient w/ Age-indeterminate fracture of the right L1 transverse process. ROS otherwise negative.     MEDICATIONS  (STANDING):  heparin   Injectable 5000 Unit(s) SubCutaneous every 12 hours  lidocaine   4% Patch 1 Patch Transdermal daily  polyethylene glycol 3350 17 Gram(s) Oral daily    MEDICATIONS  (PRN):  acetaminophen     Tablet .. 975 milliGRAM(s) Oral every 8 hours PRN Temp greater or equal to 38C (100.4F), Moderate Pain (4 - 6), Severe Pain (7 - 10)  acetaminophen     Tablet .. 650 milliGRAM(s) Oral every 8 hours PRN Mild Pain (1 - 3)  aluminum hydroxide/magnesium hydroxide/simethicone Suspension 30 milliLiter(s) Oral every 6 hours PRN Upset Stomach  melatonin 3 milliGRAM(s) Oral at bedtime PRN Insomnia    CAPILLARY BLOOD GLUCOSE  POCT Blood Glucose.: 163 mg/dL (07 Jul 2022 11:31)  POCT Blood Glucose.: 118 mg/dL (07 Jul 2022 07:43)  POCT Blood Glucose.: 163 mg/dL (07 Jul 2022 00:00)  POCT Blood Glucose.: 167 mg/dL (06 Jul 2022 22:19)  POCT Blood Glucose.: 132 mg/dL (06 Jul 2022 16:30)    Vital Signs Last 24 Hrs  T(C): 36.6 (07 Jul 2022 10:39), Max: 37.2 (07 Jul 2022 05:04)  T(F): 97.9 (07 Jul 2022 10:39), Max: 98.9 (07 Jul 2022 05:04)  HR: 89 (07 Jul 2022 10:39) (89 - 95)  BP: 120/89 (07 Jul 2022 10:39) (113/58 - 122/64)  BP(mean): --  RR: 17 (07 Jul 2022 10:39) (17 - 18)  SpO2: 98% (07 Jul 2022 10:39) (97% - 98%)    CONSTITUTIONAL: NAD, well-developed, well-groomed  EYES: conjunctiva and sclera clear  ENMT: Moist oral mucosa, poor dentition  RESPIRATORY: Normal respiratory effort; lungs are clear to auscultation bilaterally  CARDIOVASCULAR: Regular rate and rhythm, normal S1 and S2, No lower extremity edema; Peripheral pulses are 2+ bilaterally  ABDOMEN: Nontender to palpation, normoactive bowel sounds, no rebound/guarding  PSYCH: AOx3, calm, affect appropriate  NEUROLOGY: nonfocal, moving all extremities  SKIN: No rashes; no palpable lesions    LABS: no new labs                        RADIOLOGY, EKG & ADDITIONAL TESTS: Reviewed.   
Patient is a 89y old  Female who presents with a chief complaint of Fall (2022 17:03)    SUBJECTIVE / OVERNIGHT EVENTS: No acute events. Denies pain or complaints. No chest pain, nausea, vomiting, fever, chills.     MEDICATIONS  (STANDING):  cefTRIAXone   IVPB 1000 milliGRAM(s) IV Intermittent every 24 hours  heparin SubCutaneous Injection - Peds 5000 Unit(s) SubCutaneous every 12 hours  lidocaine   4% Patch 1 Patch Transdermal daily  sodium chloride 0.9%. 1000 milliLiter(s) (75 mL/Hr) IV Continuous <Continuous>    MEDICATIONS  (PRN):  acetaminophen     Tablet .. 650 milliGRAM(s) Oral every 8 hours PRN Mild Pain (1 - 3)  aluminum hydroxide/magnesium hydroxide/simethicone Suspension 30 milliLiter(s) Oral every 6 hours PRN Upset Stomach  melatonin 3 milliGRAM(s) Oral at bedtime PRN Insomnia    CAPILLARY BLOOD GLUCOSE    POCT Blood Glucose.: 128 mg/dL (2022 20:53)    I&O's Summary    PHYSICAL EXAM:  Vital Signs Last 24 Hrs  T(C): 36.4 (2022 11:46), Max: 37 (2022 06:24)  T(F): 97.6 (2022 11:46), Max: 98.6 (2022 06:24)  HR: 90 (2022 11:46) (74 - 98)  BP: 136/62 (2022 11:46) (112/54 - 137/63)  BP(mean): --  RR: 18 (2022 11:46) (18 - 18)  SpO2: 96% (2022 11:46) (96% - 100%)    CONSTITUTIONAL: NAD, well-developed, well-groomed  EYES: conjunctiva and sclera clear  ENMT: Moist oral mucosa  RESPIRATORY: Normal respiratory effort; lungs are clear to auscultation bilaterally  CARDIOVASCULAR: Regular rate and rhythm, normal S1 and S2, No lower extremity edema; Peripheral pulses are 2+ bilaterally  ABDOMEN: Nontender to palpation, normoactive bowel sounds, no rebound/guarding  PSYCH: calm, alert and oriented to person, month and year, not location or situation  NEUROLOGY: nonfocal, moving all extremities  SKIN: No rashes; no palpable lesions    LABS:                        8.9    9.45  )-----------( 353      ( 2022 12:10 )             28.6     06-30    142  |  110<H>  |  38<H>  ----------------------------<  86  4.8   |  23  |  0.71    Ca    9.0      2022 06:45  Phos  2.1     06-30  Mg     1.70     06-30    TPro  3.8<L>  /  Alb  1.8<L>  /  TBili  0.2  /  DBili  x   /  AST  47<H>  /  ALT  29  /  AlkPhos  89  06-30    CARDIAC MARKERS ( 2022 13:09 )  x     / x     / 56 U/L / x     / 3.5 ng/mL  CARDIAC MARKERS ( 2022 17:15 )  x     / x     / 166 U/L / x     / x        Urinalysis Basic - ( 2022 17:45 )    Color: Yellow / Appearance: Slightly Turbid / S.025 / pH: x  Gluc: x / Ketone: Small  / Bili: Negative / Urobili: 3 mg/dL   Blood: x / Protein: Trace / Nitrite: Negative   Leuk Esterase: Large / RBC: 14 /HPF / WBC 20 /HPF   Sq Epi: x / Non Sq Epi: 9 /HPF / Bacteria: Negative    Culture - Urine (collected 2022 21:09)  Source: Clean Catch Clean Catch (Midstream)  Preliminary Report (2022 20:02):    50,000 - 99,000 CFU/mL Escherichia coli    RADIOLOGY & ADDITIONAL TESTS: Reviewed    COORDINATION OF CARE:  Care Discussed with Consultants/Other Providers [Y- Medicine ACP]  
Patient is a 89y old  Female who presents with a chief complaint of Altered mental status  (30 Jun 2022 15:21)    SUBJECTIVE / OVERNIGHT EVENTS: No acute events. Feels comfortable this morning. Denies any focal pain or discomfort. Denies chest pain, fever, chills, shortness of breath, nausea.    MEDICATIONS  (STANDING):  cefTRIAXone   IVPB 1000 milliGRAM(s) IV Intermittent every 24 hours  heparin   Injectable 5000 Unit(s) SubCutaneous every 12 hours  lidocaine   4% Patch 1 Patch Transdermal daily  sodium chloride 0.9%. 1000 milliLiter(s) (75 mL/Hr) IV Continuous <Continuous>    MEDICATIONS  (PRN):  acetaminophen     Tablet .. 650 milliGRAM(s) Oral every 8 hours PRN Mild Pain (1 - 3)  aluminum hydroxide/magnesium hydroxide/simethicone Suspension 30 milliLiter(s) Oral every 6 hours PRN Upset Stomach  melatonin 3 milliGRAM(s) Oral at bedtime PRN Insomnia    CAPILLARY BLOOD GLUCOSE    I&O's Summary    PHYSICAL EXAM:  Vital Signs Last 24 Hrs  T(C): 36.7 (01 Jul 2022 11:00), Max: 36.9 (01 Jul 2022 05:32)  T(F): 98.1 (01 Jul 2022 11:00), Max: 98.4 (01 Jul 2022 05:32)  HR: 86 (01 Jul 2022 11:00) (81 - 88)  BP: 115/67 (01 Jul 2022 11:00) (115/67 - 125/55)  BP(mean): --  RR: 18 (01 Jul 2022 11:00) (17 - 19)  SpO2: 98% (01 Jul 2022 11:00) (97% - 98%)    CONSTITUTIONAL: NAD, well-developed, well-groomed  EYES: conjunctiva and sclera clear  ENMT: Moist oral mucosa, poor dentition  RESPIRATORY: Normal respiratory effort; lungs are clear to auscultation bilaterally  CARDIOVASCULAR: Regular rate and rhythm, normal S1 and S2, No lower extremity edema; Peripheral pulses are 2+ bilaterally  ABDOMEN: Nontender to palpation, normoactive bowel sounds, no rebound/guarding  PSYCH: calm, Alert and oriented to person, place, month, year and situation  NEUROLOGY: nonfocal, moving all extremities  SKIN: No rashes; no palpable lesions    LABS:                        9.0    9.28  )-----------( 348      ( 01 Jul 2022 06:00 )             28.7     07-01    141  |  109<H>  |  29<H>  ----------------------------<  93  4.2   |  23  |  0.69    Ca    8.8      01 Jul 2022 06:00  Phos  2.7     07-01  Mg     1.50     07-01    TPro  3.8<L>  /  Alb  1.8<L>  /  TBili  0.2  /  DBili  x   /  AST  47<H>  /  ALT  29  /  AlkPhos  89  06-30    Culture - Urine (collected 28 Jun 2022 21:09)  Source: Clean Catch Clean Catch (Midstream)  Final Report (30 Jun 2022 18:47):    50,000 - 99,000 CFU/mL Escherichia coli  Organism: Escherichia coli (30 Jun 2022 18:47)  Organism: Escherichia coli (30 Jun 2022 18:47)    RADIOLOGY & ADDITIONAL TESTS: Reviewed    COORDINATION OF CARE:  Care Discussed with Consultants/Other Providers [Y- Medicine ACP, ]  
Patient is a 89y old  Female who presents with a chief complaint of Fall (03 Jul 2022 08:53)    SUBJECTIVE / OVERNIGHT EVENTS: No acute events. Comfortable, denies pain or discomfort. States she is trying to eat and drink throughout the day because her family keeps encouraging her to eat. No nausea or vomiting. Awaiting rehab placement.     MEDICATIONS  (STANDING):  heparin   Injectable 5000 Unit(s) SubCutaneous every 12 hours  lidocaine   4% Patch 1 Patch Transdermal daily  polyethylene glycol 3350 17 Gram(s) Oral once  senna 1 Tablet(s) Oral once    MEDICATIONS  (PRN):  acetaminophen     Tablet .. 650 milliGRAM(s) Oral every 8 hours PRN Mild Pain (1 - 3)  aluminum hydroxide/magnesium hydroxide/simethicone Suspension 30 milliLiter(s) Oral every 6 hours PRN Upset Stomach  melatonin 3 milliGRAM(s) Oral at bedtime PRN Insomnia      CAPILLARY BLOOD GLUCOSE      POCT Blood Glucose.: 103 mg/dL (03 Jul 2022 11:30)  POCT Blood Glucose.: 116 mg/dL (03 Jul 2022 11:17)  POCT Blood Glucose.: 92 mg/dL (03 Jul 2022 07:44)  POCT Blood Glucose.: 93 mg/dL (02 Jul 2022 22:04)  POCT Blood Glucose.: 164 mg/dL (02 Jul 2022 16:34)    I&O's Summary      PHYSICAL EXAM:  Vital Signs Last 24 Hrs  T(C): 36.9 (03 Jul 2022 05:08), Max: 37.1 (02 Jul 2022 13:18)  T(F): 98.5 (03 Jul 2022 05:08), Max: 98.7 (02 Jul 2022 13:18)  HR: 66 (03 Jul 2022 05:08) (66 - 88)  BP: 108/61 (03 Jul 2022 05:08) (108/61 - 129/76)  BP(mean): --  RR: 17 (03 Jul 2022 05:08) (17 - 18)  SpO2: 98% (03 Jul 2022 05:08) (98% - 99%)    CONSTITUTIONAL: NAD, well-developed, well-groomed  EYES: conjunctiva and sclera clear  ENMT: Moist oral mucosa, poor dentition  RESPIRATORY: Normal respiratory effort; lungs are clear to auscultation bilaterally  CARDIOVASCULAR: Regular rate and rhythm, normal S1 and S2, No lower extremity edema; Peripheral pulses are 2+ bilaterally  ABDOMEN: Nontender to palpation, normoactive bowel sounds, no rebound/guarding  PSYCH: calm, affect appropriate  NEUROLOGY: nonfocal, moving all extremities  SKIN: No rashes; no palpable lesions    LABS:                        8.7    9.16  )-----------( 328      ( 02 Jul 2022 05:05 )             27.8     07-02    138  |  105  |  24<H>  ----------------------------<  94  4.5   |  24  |  0.70    Ca    8.6      02 Jul 2022 05:05  Phos  2.3     07-02  Mg     1.60     07-02    RADIOLOGY & ADDITIONAL TESTS: Reviewed    COORDINATION OF CARE:  Care Discussed with Consultants/Other Providers [Y- Medicine ACP]
Patient is a 89y old  Female who presents with a chief complaint of Fall (01 Jul 2022 13:47)    SUBJECTIVE / OVERNIGHT EVENTS: No acute events. Denies discomfort. Agreeable to rehab, asking when she can leave the hospital. No nausea, vomiting, chest pain, fever, chills.     MEDICATIONS  (STANDING):  heparin   Injectable 5000 Unit(s) SubCutaneous every 12 hours  lidocaine   4% Patch 1 Patch Transdermal daily    MEDICATIONS  (PRN):  acetaminophen     Tablet .. 650 milliGRAM(s) Oral every 8 hours PRN Mild Pain (1 - 3)  aluminum hydroxide/magnesium hydroxide/simethicone Suspension 30 milliLiter(s) Oral every 6 hours PRN Upset Stomach  melatonin 3 milliGRAM(s) Oral at bedtime PRN Insomnia    CAPILLARY BLOOD GLUCOSE    I&O's Summary    PHYSICAL EXAM:  Vital Signs Last 24 Hrs  T(C): 37.1 (02 Jul 2022 13:18), Max: 37.1 (02 Jul 2022 13:18)  T(F): 98.7 (02 Jul 2022 13:18), Max: 98.7 (02 Jul 2022 13:18)  HR: 88 (02 Jul 2022 13:18) (88 - 88)  BP: 117/61 (02 Jul 2022 13:18) (117/61 - 124/58)  BP(mean): --  RR: 17 (02 Jul 2022 13:18) (17 - 18)  SpO2: 99% (02 Jul 2022 13:18) (97% - 99%)    CONSTITUTIONAL: NAD, well-developed, well-groomed  EYES: conjunctiva and sclera clear  ENMT: Moist oral mucosa, poor dentition  RESPIRATORY: Normal respiratory effort; lungs are clear to auscultation bilaterally  CARDIOVASCULAR: Regular rate and rhythm, normal S1 and S2, No lower extremity edema; Peripheral pulses are 2+ bilaterally  ABDOMEN: Nontender to palpation, normoactive bowel sounds, no rebound/guarding  PSYCH: calm, AO x person, place, date, situation  NEUROLOGY: nonfocal, moving all extremities  SKIN: No rashes; no palpable lesions    LABS:                        8.7    9.16  )-----------( 328      ( 02 Jul 2022 05:05 )             27.8     07-02    138  |  105  |  24<H>  ----------------------------<  94  4.5   |  24  |  0.70    Ca    8.6      02 Jul 2022 05:05  Phos  2.3     07-02  Mg     1.60     07-02      RADIOLOGY & ADDITIONAL TESTS: Reviewed    COORDINATION OF CARE:  Care Discussed with Consultants/Other Providers [Y- Medicine ACP, RN]  
Patient is a 89y old  Female who presents with a chief complaint of Fall (03 Jul 2022 13:04)    SUBJECTIVE / OVERNIGHT EVENTS: No acute events. Denies pain or discomfort. Reports constipation, no recent BM. No abdominal pain. No fevers, chills, chest pain, shortness of breath.     MEDICATIONS  (STANDING):  bisacodyl Suppository 10 milliGRAM(s) Rectal once  heparin   Injectable 5000 Unit(s) SubCutaneous every 12 hours  lidocaine   4% Patch 1 Patch Transdermal daily  polyethylene glycol 3350 17 Gram(s) Oral daily    MEDICATIONS  (PRN):  acetaminophen     Tablet .. 650 milliGRAM(s) Oral every 8 hours PRN Mild Pain (1 - 3)  aluminum hydroxide/magnesium hydroxide/simethicone Suspension 30 milliLiter(s) Oral every 6 hours PRN Upset Stomach  melatonin 3 milliGRAM(s) Oral at bedtime PRN Insomnia    CAPILLARY BLOOD GLUCOSE    POCT Blood Glucose.: 106 mg/dL (04 Jul 2022 11:35)  POCT Blood Glucose.: 94 mg/dL (03 Jul 2022 22:15)  POCT Blood Glucose.: 124 mg/dL (03 Jul 2022 16:36)    I&O's Summary    PHYSICAL EXAM:  Vital Signs Last 24 Hrs  T(C): 36.8 (04 Jul 2022 05:21), Max: 36.8 (04 Jul 2022 05:21)  T(F): 98.2 (04 Jul 2022 05:21), Max: 98.2 (04 Jul 2022 05:21)  HR: 81 (04 Jul 2022 05:21) (81 - 89)  BP: 128/67 (04 Jul 2022 05:21) (118/90 - 128/67)  BP(mean): --  RR: 18 (04 Jul 2022 05:21) (17 - 18)  SpO2: 100% (04 Jul 2022 05:21) (98% - 100%)    CONSTITUTIONAL: NAD, well-developed, well-groomed  EYES: conjunctiva and sclera clear  ENMT: Moist oral mucosa, poor dentition  RESPIRATORY: Normal respiratory effort; lungs are clear to auscultation bilaterally  CARDIOVASCULAR: Regular rate and rhythm, normal S1 and S2, No lower extremity edema; Peripheral pulses are 2+ bilaterally  ABDOMEN: Nontender to palpation, normoactive bowel sounds, no rebound/guarding  PSYCH: AOx3, calm, affect appropriate  NEUROLOGY: nonfocal, moving all extremities  SKIN: No rashes; no palpable lesions    LABS, RADIOLOGY & ADDITIONAL TESTS: Reviewed  
Patient is a 89y old  Female who presents with a chief complaint of Fall (03 Jul 2022 13:04)    SUBJECTIVE / OVERNIGHT EVENTS: No acute events. Patient reports feeling dizzy with standing. Felt that she was "seeing colors". Feels improved now that she is sitting down. Reports she has not been eating well in setting of not liking a pureed diet. S&S re-eval ordered. Patient orthostatics +. Giving IVF. ROS otherwise negative.     MEDICATIONS  (STANDING):  heparin   Injectable 5000 Unit(s) SubCutaneous every 12 hours  lidocaine   4% Patch 1 Patch Transdermal daily  polyethylene glycol 3350 17 Gram(s) Oral daily  sodium chloride 0.9% Bolus 1000 milliLiter(s) IV Bolus once  sodium chloride 0.9%. 1000 milliLiter(s) (75 mL/Hr) IV Continuous <Continuous>  sorbitol 70%/mineral oil/magnesium hydroxide/glycerin Enema 120 milliLiter(s) Rectal once    MEDICATIONS  (PRN):  acetaminophen     Tablet .. 650 milliGRAM(s) Oral every 8 hours PRN Mild Pain (1 - 3)  aluminum hydroxide/magnesium hydroxide/simethicone Suspension 30 milliLiter(s) Oral every 6 hours PRN Upset Stomach  melatonin 3 milliGRAM(s) Oral at bedtime PRN Insomnia      CAPILLARY BLOOD GLUCOSE  POCT Blood Glucose.: 127 mg/dL (05 Jul 2022 11:18)  POCT Blood Glucose.: 93 mg/dL (05 Jul 2022 07:47)  POCT Blood Glucose.: 118 mg/dL (05 Jul 2022 00:49)  POCT Blood Glucose.: 173 mg/dL (04 Jul 2022 16:37)    Vital Signs Last 24 Hrs  T(C): 36.3 (05 Jul 2022 11:09), Max: 36.9 (05 Jul 2022 05:22)  T(F): 97.3 (05 Jul 2022 11:09), Max: 98.4 (05 Jul 2022 05:22)  HR: 103 (05 Jul 2022 11:09) (88 - 103)  BP: 116/63 (05 Jul 2022 05:22) (112/56 - 125/62)  BP(mean): --  RR: 17 (05 Jul 2022 11:09) (16 - 18)  SpO2: 98% (05 Jul 2022 11:09) (98% - 100%)    CONSTITUTIONAL: NAD, well-developed, well-groomed  EYES: conjunctiva and sclera clear  ENMT: Moist oral mucosa, poor dentition  RESPIRATORY: Normal respiratory effort; lungs are clear to auscultation bilaterally  CARDIOVASCULAR: Regular rate and rhythm, normal S1 and S2, No lower extremity edema; Peripheral pulses are 2+ bilaterally  ABDOMEN: Nontender to palpation, normoactive bowel sounds, no rebound/guarding  PSYCH: AOx3, calm, affect appropriate  NEUROLOGY: nonfocal, moving all extremities  SKIN: No rashes; no palpable lesions    LABS: no new labs                        RADIOLOGY, EKG & ADDITIONAL TESTS: Reviewed.   
Patient is a 89y old  Female who presents with a chief complaint of Fall (03 Jul 2022 13:04)    SUBJECTIVE / OVERNIGHT EVENTS: Patient reports dizziness has resolved. She feels improved after IVF. Patient reports she is eating more now that she is no longer on a pureed diet. ROS otherwise negative.     MEDICATIONS  (STANDING):  heparin   Injectable 5000 Unit(s) SubCutaneous every 12 hours  lidocaine   4% Patch 1 Patch Transdermal daily  polyethylene glycol 3350 17 Gram(s) Oral daily  sodium chloride 0.9% Bolus 1000 milliLiter(s) IV Bolus once  sodium chloride 0.9%. 1000 milliLiter(s) (75 mL/Hr) IV Continuous <Continuous>  sorbitol 70%/mineral oil/magnesium hydroxide/glycerin Enema 120 milliLiter(s) Rectal once    MEDICATIONS  (PRN):  acetaminophen     Tablet .. 650 milliGRAM(s) Oral every 8 hours PRN Mild Pain (1 - 3)  aluminum hydroxide/magnesium hydroxide/simethicone Suspension 30 milliLiter(s) Oral every 6 hours PRN Upset Stomach  melatonin 3 milliGRAM(s) Oral at bedtime PRN Insomnia      CAPILLARY BLOOD GLUCOSE  POCT Blood Glucose.: 127 mg/dL (05 Jul 2022 11:18)  POCT Blood Glucose.: 93 mg/dL (05 Jul 2022 07:47)  POCT Blood Glucose.: 118 mg/dL (05 Jul 2022 00:49)  POCT Blood Glucose.: 173 mg/dL (04 Jul 2022 16:37)    Vital Signs Last 24 Hrs  T(C): 36.4 (06 Jul 2022 10:21), Max: 36.9 (05 Jul 2022 21:51)  T(F): 97.5 (06 Jul 2022 10:21), Max: 98.4 (05 Jul 2022 21:51)  HR: 76 (06 Jul 2022 10:21) (76 - 90)  BP: 110/56 (06 Jul 2022 10:21) (110/56 - 134/68)  BP(mean): --  RR: 17 (06 Jul 2022 10:21) (17 - 18)  SpO2: 99% (06 Jul 2022 10:21) (98% - 99%)    CONSTITUTIONAL: NAD, well-developed, well-groomed  EYES: conjunctiva and sclera clear  ENMT: Moist oral mucosa, poor dentition  RESPIRATORY: Normal respiratory effort; lungs are clear to auscultation bilaterally  CARDIOVASCULAR: Regular rate and rhythm, normal S1 and S2, No lower extremity edema; Peripheral pulses are 2+ bilaterally  ABDOMEN: Nontender to palpation, normoactive bowel sounds, no rebound/guarding  PSYCH: AOx3, calm, affect appropriate  NEUROLOGY: nonfocal, moving all extremities  SKIN: No rashes; no palpable lesions    LABS: no new labs                        RADIOLOGY, EKG & ADDITIONAL TESTS: Reviewed.

## 2022-07-07 NOTE — PROGRESS NOTE ADULT - PROBLEM SELECTOR PLAN 2
Improved s/p fluid resuscitation  - encourage PO intake
Patient w/ orthostatic hypotension 7/5, likely 2/2 to poor po intake. Patient reports she has not been eating and drinking well in setting of not liking her pureed diet.     - repeat S&S eval to assess for advancement of diet  - 1L NS bolus, followed by NS IVF 75cc/hr   - repeat orthostatics 7/6 AM
Patient w/ orthostatic hypotension 7/5, likely 2/2 to poor po intake. Patient reports she has not been eating and drinking well in setting of not liking her pureed diet.     - repeat S&S eval to assess for advancement of diet--> advanced to regular diet  - s/p 1L NS bolus, followed by NS IVF 75cc/hr, discontinued patient now orthostatic negative 7/6
Dry oral mucosa. Elevated BUN likely related to dehydration now downtrending  - S/p fluid resuscitation  - encourage PO intake
Improved s/p fluid resuscitation  - continue to encourage PO intake
Improved s/p fluid resuscitation  - continue to encourage PO intake
Dry oral mucosa. Elevated BUN likely related to dehydration.   - S/p fluid resuscitation - repeat BMP for 6/29 pending - follow up.
Improved s/p fluid resuscitation  - continue to encourage PO intake
RESOLVED  Patient w/ orthostatic hypotension 7/5, likely 2/2 to poor po intake. Patient reports she has not been eating and drinking well in setting of not liking her pureed diet.     - repeat S&S eval to assess for advancement of diet--> advanced to regular diet  - s/p 1L NS bolus, followed by NS IVF 75cc/hr, discontinued patient now orthostatic negative 7/6  - repeat orthostatics negative 7/7

## 2022-07-07 NOTE — DISCHARGE NOTE NURSING/CASE MANAGEMENT/SOCIAL WORK - PATIENT PORTAL LINK FT
You can access the FollowMyHealth Patient Portal offered by [facility] by registering at the following website: [email or web address]. By joining [facility]’s FollowMyHealth portal, you will also be able to view your health information using other applications (apps) compatible with our system.

## 2022-07-07 NOTE — PROGRESS NOTE ADULT - PROBLEM SELECTOR PROBLEM 8
Abnormal TSH
Microcytic anemia
Abnormal TSH
Microcytic anemia
Abnormal TSH
Microcytic anemia

## 2023-01-12 NOTE — PHYSICAL THERAPY INITIAL EVALUATION ADULT - BED MOBILITY LIMITATIONS, REHAB EVAL
2 = A lot of assistance decreased ability to use legs for bridging/pushing/impaired ability to control trunk for mobility

## 2023-05-10 NOTE — OCCUPATIONAL THERAPY INITIAL EVALUATION ADULT - PRECAUTIONS/LIMITATIONS, REHAB EVAL
Anesthesia Pre Eval Note    Anesthesia ROS/Med Hx        Anesthetic Complication History:  Patient does not have a history of anesthetic complications      Pulmonary Review:  Patient does not have a pulmonary history      Neuro/Psych Review:  Patient does not have a neuro/psych history       Cardiovascular Review:  Patient does not have a cardiovascular history       GI/HEPATIC/RENAL Review:  Patient does not have a GI/hepatic/renalhistory       End/Other Review:  Patient does not have an endo/other history        Relevant Problems   No relevant active problems       Physical Exam     Airway   Mallampati: II  TM Distance: >3 FB  Neck ROM: Full    Cardiovascular  Cardiovascular exam normal  Cardio Rhythm: Regular  Cardio Rate: Normal    Pulmonary Exam  Pulmonary exam normal  Breath sounds clear to auscultation:  Yes      Anesthesia Plan:    ASA Status: 1  Anesthesia Type: General    Induction: Intravenous  Preferred Airway Type: LMA  Maintenance: Inhalational    Post-op Pain Management: Per Surgeon      Checklist  Reviewed: NPO Status, Allergies, Medications, Problem list and Past Med History  Consent/Risks Discussed Statement:  The proposed anesthetic plan, including its risks and benefits, have been discussed with the Patient along with the risks and benefits of alternatives. Questions were encouraged and answered and the patient and/or representative understands and agrees to proceed.        I discussed with the patient (and/or patient's legal representative) the risks and benefits of the proposed anesthesia plan, General, which may include services performed by other anesthesia providers.    Alternative anesthesia plans, if available, were reviewed with the patient (and/or patient's legal representative). Discussion has been held with the patient (and/or patient's legal representative) regarding risks of anesthesia, which include vomiting, nausea, dental injury and sore throat and emergent situations that may  require change in anesthesia plan.    The patient (and/or patient's legal representative) has indicated understanding, his/her questions have been answered, and he/she wishes to proceed with the planned anesthetic.    Blood Products: Not Anticipated     aspiration precautions/fall precautions

## 2023-07-12 NOTE — PATIENT PROFILE ADULT. - SPIRITUAL CULTURAL, RELIGIOUS PRACTICES/VALUES, PROFILE
Follow-up with urology  Take OTC Motrin or Tylenol as needed for pain  Take prescribed medication as prescribed. Make sure you complete the whole 7-day course of antibiotics. Return for any worsening  Get established with a primary care provider    [facility] [place] [facility] Referral number [phone] for [address] [place] [address]  [facility]. [address]  Fax [phone]  Medical, OB/Gyn, Pediatrics, [facility]  Serves all of [facility] (Formerly [facility])  [facility] [address] [facility]  [address] [place] [address]  [address] [facility]. (Administrative offices)  [phone]  Homeless only [facility])  [address], [place], 27[address] [facility] [address] [facility]  [phone] or [phone], [facility],   Dental Appointments [phone] or [phone]  Pediatric, Family Practice, X-Ray  Serves all of [facility])  [address] [place] [address]. [facility]  [phone]   [facility] ([facility])   [address] [facility]    (Located in [id number] or [phone], 28[facility],   Dental Appointments [phone] or [address]  [facility], [address]  [address]  [address] [facility]  3[facility].  [address] [facility] [address] Fax [address]  and Surrounding areas [facility]. [address] [facility] [address] Fax [phone]  Medical, OB/Gyn, Pediatrics  Dental Clinic, [facility] limits [address]  [phone] Fax [phone]  [facility], Pediatrics, [address] 10[address] [facility]   [address]  [address] [facility]. none

## 2023-08-10 NOTE — PATIENT PROFILE ADULT. - FUNCTIONAL SCREEN CURRENT LEVEL: COMMUNICATION, MLM
Pt reminded of labs states she will come in on 8/14/23 to have labs done.    (0) understands/communicates without difficulty

## 2023-08-31 NOTE — ED ADULT NURSE NOTE - SUICIDE SCREENING DEPRESSION
CHW - Initial Contact    This Community Health Worker completed OR updated the Social Determinant of Health questionnaire with patient via telephone today.    Pt identified barriers of most importance are: Food Insecurity. Has already placed SNAP benefits application. Waiting on answer. Sending pantry food locations because pt doesn't meet age qualifications for other resources.   Referrals to community agencies completed with patient/caregiver consent outside of [employer] [facility] include: yes  Referrals were put through [facility] - no:   Support and Services: Food Pantry  Other information discussed the patient needs / wants help with: SDOH   Follow up required: yes  Follow-up Outreach - Due: 9/14/2023     
Negative

## 2024-09-28 NOTE — H&P ADULT - NSHPSOCIALHISTORY_GEN_ALL_CORE
no Lives alone, ADLs and IADLs intact per granddaughter. No alcohol, tobacco, or recreational drug use. Lives alone  ADLs and IADLs intact per granddaughter  Reports no alcohol, tobacco, or recreational drug use  Retired

## 2025-02-08 NOTE — PROGRESS NOTE ADULT - PROBLEM SELECTOR PLAN 1
TERTIARY TRAUMA SURVEY  ------------------------------------------------------------------------------------------    Date/Time: 02-08-25 @ 10:54  Admit date:   Trauma activation:   Admit GCS:  	E-  	V-  	M-      HPI:  76-year-old female PMH of HTN, DM, HLD, on aspirin, scleroderma, presents for lightheadedness and syncope with head trauma. Patient states she was visiting friends in a nursing home, was walking to the bus stop to leave, felt lightheaded and passed out. She was able to ambulate with assistance after the fall. Has trauma to the left side of head, on aspirin, admits to right knee pain, abrasions to left hand.   No chest pain back pain nausea vomiting or abdominal pain no pain with any extraocular movements  Spoke to patient with [place]  Ekatrina #[id number]    Vitals in the ED: /79, HR 79, T 98.9, RR 18, SpO2 97% on RA  EKG: NSR HR 77  Labs: WBC 3, Hgb 10.7, trop 17-12, electrolytes within normal  Imaging:   - Xray Chest (02.07.25 @ 15:26): No radiographic evidence of acute cardiopulmonary disease.  - Xray Knee, Right (02.07.25 @ 15:26): No acute displaced fracture.  - Xray Hand, Left (02.07.25 @ 15:26): No acute displaced fracture.  - CT Head No Cont (02.07.25 @ 14:52):  1. No evidence of acute intracranial pathology. 2.  Partially visualized comminuted, mildly displaced fractures of the left orbital floor and posterolateral wall of the left maxillary sinus. 3.  Left lateral periorbital soft tissue swelling/subcutaneous hematoma.  - CT Cervical Spine No Cont (02.07.25 @ 22:46): No evidence of acute fracture of the cervical spine.  - CT Maxillofacial No Cont (02.07.25 @ 21:00): Left zygomatic arch fracture. Acute fractures of lateral orbital wall and lateral maxillary sinus. Additional left posterior inferior orbital wall fracture.  - CT Chest, Abdomen and Pelvis w/ IV Cont (02.07.25 @ 22:47): No evidence of acute traumatic thoracic or abdominal pathology.    In the ED, patient received Unasyn, LR 1L bolus, Reglan and Morphine 4mg IV once.  (08 Feb 2025 00:33)      INTERVAL EVENTS: Tertiary exam was done by the trauma surgery team using  (ID [id number]). She denies any pain, nausea or vomiting. On PE, she has some scabs on her left knuckles and a small right knee abrasions.     PAST MEDICAL & SURGICAL HISTORY:    [X] No significant past history as reviewed with the patient and family    FAMILY HISTORY:    [X] Family history not pertinent as reviewed with the patient and family    ALLERGIES: No Known Allergies      CURRENT MEDICATIONS:   MEDICATIONS (STANDING): aspirin  chewable 81 milliGRAM(s) Oral daily  dextrose 5%. 1000 milliLiter(s) IV Continuous <Continuous>  dextrose 5%. 1000 milliLiter(s) IV Continuous <Continuous>  dextrose 50% Injectable 25 Gram(s) IV Push once  dextrose 50% Injectable 12.5 Gram(s) IV Push once  dextrose 50% Injectable 25 Gram(s) IV Push once  glucagon  Injectable 1 milliGRAM(s) IntraMuscular once  heparin   Injectable 5000 Unit(s) SubCutaneous every 12 hours  insulin lispro (ADMELOG) corrective regimen sliding scale   SubCutaneous three times a day before meals  isoniazid 300 milliGRAM(s) Oral daily  pyridoxine 50 milliGRAM(s) Oral daily    MEDICATIONS (PRN):dextrose Oral Gel 15 Gram(s) Oral once PRN Blood Glucose LESS THAN 70 milliGRAM(s)/deciliter  melatonin 3 milliGRAM(s) Oral at bedtime PRN Insomnia    ------------------------------------------------------------------------------------------    VITAL SIGNS  T(C): 36.7 (02-08-25 @ 08:36), Max: 37.2 (02-07-25 @ 13:53)  HR: 79 (02-08-25 @ 08:36) (70 - 79)  BP: 115/69 (02-08-25 @ 08:36) (102/61 - 119/79)  RR: 18 (02-08-25 @ 08:36) (18 - 18)  SpO2: 97% (02-08-25 @ 08:36) (97% - 98%)  CAPILLARY BLOOD GLUCOSE      POCT Blood Glucose.: 80 mg/dL (08 Feb 2025 08:17)  POCT Blood Glucose.: 120 mg/dL (07 Feb 2025 13:57)      INS/OUTS:    Drug Dosing Weight      PHYSICAL EXAM:    General: NAD, Sitting in bed comfortably  HEENT: NC/AT, EOMI  Neck: Soft, supple, no cervical lymphadenopathy   Cardio: RRR  Resp: Good effort, CTA b/l  Thorax: No chest wall tenderness  GI/Abd: Soft, NT/ND, no rebound/guarding  Vascular: Extremities warm, brisk cap refill, B/l radial pulses palpable, b/l DP/PT palpable  Skin: L knuckle scabs and right knee abrasion   Lymphatic/Nodes: No palpable lymphadenopathy  Musculoskeletal: All 4 extremities moving spontaneously, no limitations  ------------------------------------------------------------------------------------------    LABS  CBC (02-08 @ 07:53)                              10.4[L]                         7.36    )----------------(  209        66.4  % Neutrophils, 21.2  % Lymphocytes, ANC: 4.89                                32.2[L]  CBC (02-07 @ 14:42)                              10.7[L]                         5.56    )----------------(  206        68.7  % Neutrophils, 20.9  % Lymphocytes, ANC: 3.82                                33.4[L]    BMP (02-08 @ 07:53)             134[L]  |  102     |  10    		Ca++ --      Ca 8.7                ---------------------------------( 92    		Mg 1.7[L]             3.6     |  22      |  0.5[L]			Ph --      BMP (02-07 @ 14:42)             137     |  102     |  12    		Ca++ --      Ca 8.7                ---------------------------------( 110[H]		Mg --                 3.9     |  22      |  0.5[L]			Ph --        LFTs (02-08 @ 07:53)      TPro 5.3[L] / Alb 3.8 / TBili 0.4 / DBili -- / AST 17 / ALT 7 / AlkPhos 98  LFTs (02-07 @ 14:42)      TPro 5.8[L] / Alb 4.1 / TBili 0.3 / DBili -- / AST 17 / ALT 7 / AlkPhos 103              MICROBIOLOGY  Urinalysis (02-08 @ 07:53):     Color:  / Appearance:  / SG:  / pH:  / Gluc: 92 / Ketones:  / Bili:  / Urobili:  / Protein : / Nitrites:  / Leuk.Est:  / RBC:  / WBC:  / Sq Epi:  / Non Sq Epi:  / Bacteria          ------------------------------------------------------------------------------------------    RADIOLOGICAL FINDINGS REVIEW:    CXR: < from: Xray Chest 1 View- PORTABLE-Urgent (02.07.25 @ 15:26) >    IMPRESSION:    No radiographic evidence of acute cardiopulmonary disease.    < end of copied text >    < from: Xray Hand 3 Views, Left (02.07.25 @ 15:26) >      IMPRESSION:    No acute displaced fracture.    --- End of Report ---        < end of copied text >    < from: CT Maxillofacial No Cont (02.07.25 @ 21:00) >    IMPRESSION:  Left zygomatic arch fracture.  Acute fractures of lateral orbital wall and lateral maxillary sinus.  Additional left posterior inferior orbital wall fracture.    < end of copied text >    < from: CT Chest w/ IV Cont (02.07.25 @ 22:44) >      IMPRESSION:  No evidence of acute traumatic thoracic or abdominal pathology.    --- End of Report ---        < end of copied text >    < from: CT Cervical Spine No Cont (02.07.25 @ 22:46) >    Impression:    No evidence of acute fracture of the cervical spine.    --- End of Report ---    < end of copied text >      List Injuries  Syncope      Consults (Date):   OMFS: - Recommend CT maxillofacial without contrast to completely visualize facial fractures and rule out other fractures.  - No surgical intervention  - Sinus precautions: do not blow nose, sneeze with mouth open  - Afrin nasal spray BID 3 days, Saline nasal spray PRN congestion supportive care  expectorant